# Patient Record
Sex: MALE | Race: WHITE | NOT HISPANIC OR LATINO | Employment: FULL TIME | URBAN - METROPOLITAN AREA
[De-identification: names, ages, dates, MRNs, and addresses within clinical notes are randomized per-mention and may not be internally consistent; named-entity substitution may affect disease eponyms.]

---

## 2017-01-16 ENCOUNTER — COMMUNICATION - HEALTHEAST (OUTPATIENT)
Dept: INTERNAL MEDICINE | Facility: CLINIC | Age: 22
End: 2017-01-16

## 2017-01-16 DIAGNOSIS — F90.0 ATTENTION DEFICIT HYPERACTIVITY DISORDER (ADHD), PREDOMINANTLY INATTENTIVE TYPE: ICD-10-CM

## 2017-01-18 ENCOUNTER — COMMUNICATION - HEALTHEAST (OUTPATIENT)
Dept: INTERNAL MEDICINE | Facility: CLINIC | Age: 22
End: 2017-01-18

## 2017-01-23 ENCOUNTER — AMBULATORY - HEALTHEAST (OUTPATIENT)
Dept: INTERNAL MEDICINE | Facility: CLINIC | Age: 22
End: 2017-01-23

## 2017-04-06 ENCOUNTER — COMMUNICATION - HEALTHEAST (OUTPATIENT)
Dept: INTERNAL MEDICINE | Facility: CLINIC | Age: 22
End: 2017-04-06

## 2017-04-27 ENCOUNTER — COMMUNICATION - HEALTHEAST (OUTPATIENT)
Dept: INTERNAL MEDICINE | Facility: CLINIC | Age: 22
End: 2017-04-27

## 2017-05-24 ENCOUNTER — COMMUNICATION - HEALTHEAST (OUTPATIENT)
Dept: INTERNAL MEDICINE | Facility: CLINIC | Age: 22
End: 2017-05-24

## 2017-07-12 ENCOUNTER — COMMUNICATION - HEALTHEAST (OUTPATIENT)
Dept: INTERNAL MEDICINE | Facility: CLINIC | Age: 22
End: 2017-07-12

## 2017-08-17 ENCOUNTER — OFFICE VISIT - HEALTHEAST (OUTPATIENT)
Dept: INTERNAL MEDICINE | Facility: CLINIC | Age: 22
End: 2017-08-17

## 2017-08-17 ENCOUNTER — COMMUNICATION - HEALTHEAST (OUTPATIENT)
Dept: INTERNAL MEDICINE | Facility: CLINIC | Age: 22
End: 2017-08-17

## 2017-08-17 DIAGNOSIS — R07.9 CHEST PAIN: ICD-10-CM

## 2017-08-17 DIAGNOSIS — R10.32 LEFT GROIN PAIN: ICD-10-CM

## 2017-08-17 DIAGNOSIS — J45.909 ASTHMA: ICD-10-CM

## 2017-08-17 LAB
ATRIAL RATE - MUSE: 57 BPM
DIASTOLIC BLOOD PRESSURE - MUSE: NORMAL MMHG
INTERPRETATION ECG - MUSE: NORMAL
P AXIS - MUSE: 64 DEGREES
PR INTERVAL - MUSE: 174 MS
QRS DURATION - MUSE: 100 MS
QT - MUSE: 398 MS
QTC - MUSE: 387 MS
R AXIS - MUSE: 83 DEGREES
SYSTOLIC BLOOD PRESSURE - MUSE: NORMAL MMHG
T AXIS - MUSE: 46 DEGREES
VENTRICULAR RATE- MUSE: 57 BPM

## 2018-07-23 ENCOUNTER — OFFICE VISIT - HEALTHEAST (OUTPATIENT)
Dept: INTERNAL MEDICINE | Facility: CLINIC | Age: 23
End: 2018-07-23

## 2018-07-23 DIAGNOSIS — G43.909 MIGRAINE: ICD-10-CM

## 2018-07-23 DIAGNOSIS — J02.9 PHARYNGITIS: ICD-10-CM

## 2018-07-23 DIAGNOSIS — L30.9 ECZEMA: ICD-10-CM

## 2018-07-23 DIAGNOSIS — F90.9 ADHD (ATTENTION DEFICIT HYPERACTIVITY DISORDER): ICD-10-CM

## 2018-07-23 DIAGNOSIS — J30.9 ALLERGIC RHINITIS: ICD-10-CM

## 2018-07-23 DIAGNOSIS — Z00.00 ANNUAL PHYSICAL EXAM: ICD-10-CM

## 2018-07-23 DIAGNOSIS — J45.909 ASTHMA: ICD-10-CM

## 2018-07-23 DIAGNOSIS — J45.990 EXERCISE-INDUCED ASTHMA: ICD-10-CM

## 2018-07-23 LAB — DEPRECATED S PYO AG THROAT QL EIA: NORMAL

## 2018-07-23 ASSESSMENT — MIFFLIN-ST. JEOR: SCORE: 1485.21

## 2018-07-24 LAB — GROUP A STREP BY PCR: NORMAL

## 2019-01-28 ENCOUNTER — COMMUNICATION - HEALTHEAST (OUTPATIENT)
Dept: FAMILY MEDICINE | Facility: CLINIC | Age: 24
End: 2019-01-28

## 2019-01-28 DIAGNOSIS — J45.909 ASTHMA: ICD-10-CM

## 2019-03-05 ENCOUNTER — OFFICE VISIT - HEALTHEAST (OUTPATIENT)
Dept: FAMILY MEDICINE | Facility: CLINIC | Age: 24
End: 2019-03-05

## 2019-03-05 DIAGNOSIS — Z00.00 ROUTINE GENERAL MEDICAL EXAMINATION AT A HEALTH CARE FACILITY: ICD-10-CM

## 2019-03-05 DIAGNOSIS — J45.20 MILD INTERMITTENT ASTHMA WITHOUT COMPLICATION: ICD-10-CM

## 2019-03-05 DIAGNOSIS — L72.9 SCALP CYST: ICD-10-CM

## 2019-03-05 LAB
CHOLEST SERPL-MCNC: 204 MG/DL
FASTING STATUS PATIENT QL REPORTED: YES
FASTING STATUS PATIENT QL REPORTED: YES
GLUCOSE BLD-MCNC: 66 MG/DL (ref 70–125)
HDLC SERPL-MCNC: 47 MG/DL
LDLC SERPL CALC-MCNC: 129 MG/DL
TRIGL SERPL-MCNC: 138 MG/DL

## 2019-03-05 ASSESSMENT — MIFFLIN-ST. JEOR: SCORE: 1485.67

## 2020-06-29 ENCOUNTER — OFFICE VISIT - HEALTHEAST (OUTPATIENT)
Dept: FAMILY MEDICINE | Facility: CLINIC | Age: 25
End: 2020-06-29

## 2020-06-29 DIAGNOSIS — J45.20 MILD INTERMITTENT ASTHMA WITHOUT COMPLICATION: ICD-10-CM

## 2020-06-29 DIAGNOSIS — J30.1 SEASONAL ALLERGIC RHINITIS DUE TO POLLEN: ICD-10-CM

## 2020-06-29 RX ORDER — FEXOFENADINE HCL 180 MG/1
180 TABLET ORAL DAILY
Status: SHIPPED | COMMUNITY
Start: 2020-06-29

## 2020-09-25 ENCOUNTER — COMMUNICATION - HEALTHEAST (OUTPATIENT)
Dept: SCHEDULING | Facility: CLINIC | Age: 25
End: 2020-09-25

## 2020-12-02 ENCOUNTER — RECORDS - HEALTHEAST (OUTPATIENT)
Dept: ADMINISTRATIVE | Facility: OTHER | Age: 25
End: 2020-12-02

## 2020-12-09 ENCOUNTER — OFFICE VISIT - HEALTHEAST (OUTPATIENT)
Dept: INTERNAL MEDICINE | Facility: CLINIC | Age: 25
End: 2020-12-09

## 2020-12-09 DIAGNOSIS — J45.990 EXERCISE-INDUCED ASTHMA: ICD-10-CM

## 2020-12-09 DIAGNOSIS — J30.1 NON-SEASONAL ALLERGIC RHINITIS DUE TO POLLEN: ICD-10-CM

## 2020-12-09 DIAGNOSIS — K62.5 RECTAL BLEEDING: ICD-10-CM

## 2021-01-27 ENCOUNTER — RECORDS - HEALTHEAST (OUTPATIENT)
Dept: ADMINISTRATIVE | Facility: OTHER | Age: 26
End: 2021-01-27

## 2021-02-09 ENCOUNTER — RECORDS - HEALTHEAST (OUTPATIENT)
Dept: ADMINISTRATIVE | Facility: OTHER | Age: 26
End: 2021-02-09

## 2021-02-11 ENCOUNTER — OFFICE VISIT - HEALTHEAST (OUTPATIENT)
Dept: INTERNAL MEDICINE | Facility: CLINIC | Age: 26
End: 2021-02-11

## 2021-02-11 ENCOUNTER — AMBULATORY - HEALTHEAST (OUTPATIENT)
Dept: LAB | Facility: HOSPITAL | Age: 26
End: 2021-02-11

## 2021-02-11 DIAGNOSIS — N46.9 INFERTILITY MALE: ICD-10-CM

## 2021-02-11 DIAGNOSIS — F32.1 CURRENT MODERATE EPISODE OF MAJOR DEPRESSIVE DISORDER WITHOUT PRIOR EPISODE (H): ICD-10-CM

## 2021-02-11 DIAGNOSIS — J45.20 MILD INTERMITTENT ASTHMA WITHOUT COMPLICATION: ICD-10-CM

## 2021-02-11 DIAGNOSIS — K62.5 RECTAL BLEEDING: ICD-10-CM

## 2021-02-11 DIAGNOSIS — J30.1 NON-SEASONAL ALLERGIC RHINITIS DUE TO POLLEN: ICD-10-CM

## 2021-02-11 LAB
ALBUMIN SERPL-MCNC: 4.5 G/DL (ref 3.5–5)
ALP SERPL-CCNC: 83 U/L (ref 45–120)
ALT SERPL W P-5'-P-CCNC: 19 U/L (ref 0–45)
ANION GAP SERPL CALCULATED.3IONS-SCNC: 9 MMOL/L (ref 5–18)
AST SERPL W P-5'-P-CCNC: 17 U/L (ref 0–40)
BILIRUB SERPL-MCNC: 1.1 MG/DL (ref 0–1)
BUN SERPL-MCNC: 17 MG/DL (ref 8–22)
CALCIUM SERPL-MCNC: 9.9 MG/DL (ref 8.5–10.5)
CHLORIDE BLD-SCNC: 101 MMOL/L (ref 98–107)
CO2 SERPL-SCNC: 28 MMOL/L (ref 22–31)
CREAT SERPL-MCNC: 1.2 MG/DL (ref 0.7–1.3)
ERYTHROCYTE [DISTWIDTH] IN BLOOD BY AUTOMATED COUNT: 11.7 % (ref 11–14.5)
GFR SERPL CREATININE-BSD FRML MDRD: >60 ML/MIN/1.73M2
GLUCOSE BLD-MCNC: 86 MG/DL (ref 70–125)
HCT VFR BLD AUTO: 49.5 % (ref 40–54)
HGB BLD-MCNC: 17.5 G/DL (ref 14–18)
MCH RBC QN AUTO: 31.1 PG (ref 27–34)
MCHC RBC AUTO-ENTMCNC: 35.4 G/DL (ref 32–36)
MCV RBC AUTO: 88 FL (ref 80–100)
PLATELET # BLD AUTO: 275 THOU/UL (ref 140–440)
PMV BLD AUTO: 9.6 FL (ref 7–10)
POTASSIUM BLD-SCNC: 5 MMOL/L (ref 3.5–5)
PROT SERPL-MCNC: 7.9 G/DL (ref 6–8)
RBC # BLD AUTO: 5.63 MILL/UL (ref 4.4–6.2)
SODIUM SERPL-SCNC: 138 MMOL/L (ref 136–145)
TSH SERPL DL<=0.005 MIU/L-ACNC: 0.97 UIU/ML (ref 0.3–5)
WBC: 6.2 THOU/UL (ref 4–11)

## 2021-02-11 RX ORDER — MONTELUKAST SODIUM 10 MG/1
10 TABLET ORAL DAILY
Qty: 90 TABLET | Refills: 2 | Status: SHIPPED | OUTPATIENT
Start: 2021-02-11 | End: 2022-06-30

## 2021-02-11 RX ORDER — ALBUTEROL SULFATE 90 UG/1
2 AEROSOL, METERED RESPIRATORY (INHALATION) EVERY 6 HOURS PRN
Qty: 1 EACH | Refills: 5 | Status: SHIPPED | OUTPATIENT
Start: 2021-02-11 | End: 2022-02-22

## 2021-02-11 ASSESSMENT — PATIENT HEALTH QUESTIONNAIRE - PHQ9: SUM OF ALL RESPONSES TO PHQ QUESTIONS 1-9: 15

## 2021-02-12 LAB
ANNOTATION COMMENT IMP: NORMAL
CHARACTER SMN: NORMAL
IMM GERM CELLS # SMN: 0 X10(6)
PATIENT LOCATION: NORMAL
PH SMN: 8.5 [PH]
SEX ABSTIN DURATION TIME PATIENT: 2 D
SMN ANALYSIS METHOD: NORMAL
SPEC CONTAINER SPEC: NORMAL
SPECIMEN VOL SMN: 2.5 ML
SPERM # SMN: 79.9 X10(6)
SPERM ABNORM HEAD SHAPE NFR SMN: 14.5 %
SPERM ABNORM HEAD SIZE NFR SMN: 0 %
SPERM ABNORM MIDPIECE NFR SMN: 23.5 %
SPERM ABNORM TAIL NFR SMN: 25.5 %
SPERM ACROSOME DEFECTS NFR SMN: 21.5 %
SPERM AGGLUTINATED SMN QL: 4
SPERM DOUBLE FORMS NFR SMN: 0 %
SPERM MOTILE # SMN: 52.7 X10(6)
SPERM MOTILE NFR SMN: 131.8 X10(6)
SPERM MOTILE NFR SMN: 66 %
SPERM MOTILE SMN QL MICRO: 3
SPERM MULTIPLE DEFECTS NFR SMN: 0 %
SPERM NORM NFR SMN: 15 %
VISC SMN QL: 3
WBC # SMN: 0 X10(6)

## 2021-04-22 ENCOUNTER — OFFICE VISIT - HEALTHEAST (OUTPATIENT)
Dept: BEHAVIORAL HEALTH | Facility: CLINIC | Age: 26
End: 2021-04-22

## 2021-04-22 DIAGNOSIS — F90.0 ATTENTION DEFICIT HYPERACTIVITY DISORDER (ADHD), PREDOMINANTLY INATTENTIVE TYPE: ICD-10-CM

## 2021-04-22 DIAGNOSIS — F32.1 CURRENT MODERATE EPISODE OF MAJOR DEPRESSIVE DISORDER WITHOUT PRIOR EPISODE (H): ICD-10-CM

## 2021-04-22 ASSESSMENT — ANXIETY QUESTIONNAIRES
GAD7 TOTAL SCORE: 8
4. TROUBLE RELAXING: SEVERAL DAYS
2. NOT BEING ABLE TO STOP OR CONTROL WORRYING: SEVERAL DAYS
6. BECOMING EASILY ANNOYED OR IRRITABLE: SEVERAL DAYS
IF YOU CHECKED OFF ANY PROBLEMS ON THIS QUESTIONNAIRE, HOW DIFFICULT HAVE THESE PROBLEMS MADE IT FOR YOU TO DO YOUR WORK, TAKE CARE OF THINGS AT HOME, OR GET ALONG WITH OTHER PEOPLE: SOMEWHAT DIFFICULT
1. FEELING NERVOUS, ANXIOUS, OR ON EDGE: SEVERAL DAYS
5. BEING SO RESTLESS THAT IT IS HARD TO SIT STILL: SEVERAL DAYS
7. FEELING AFRAID AS IF SOMETHING AWFUL MIGHT HAPPEN: MORE THAN HALF THE DAYS
3. WORRYING TOO MUCH ABOUT DIFFERENT THINGS: SEVERAL DAYS

## 2021-04-22 ASSESSMENT — PATIENT HEALTH QUESTIONNAIRE - PHQ9: SUM OF ALL RESPONSES TO PHQ QUESTIONS 1-9: 14

## 2021-05-04 ENCOUNTER — OFFICE VISIT - HEALTHEAST (OUTPATIENT)
Dept: BEHAVIORAL HEALTH | Facility: CLINIC | Age: 26
End: 2021-05-04

## 2021-05-04 DIAGNOSIS — F90.0 ATTENTION DEFICIT HYPERACTIVITY DISORDER (ADHD), PREDOMINANTLY INATTENTIVE TYPE: ICD-10-CM

## 2021-05-06 ENCOUNTER — AMBULATORY - HEALTHEAST (OUTPATIENT)
Dept: NURSING | Facility: CLINIC | Age: 26
End: 2021-05-06

## 2021-05-18 ENCOUNTER — OFFICE VISIT - HEALTHEAST (OUTPATIENT)
Dept: BEHAVIORAL HEALTH | Facility: CLINIC | Age: 26
End: 2021-05-18

## 2021-05-18 DIAGNOSIS — F90.0 ATTENTION DEFICIT HYPERACTIVITY DISORDER (ADHD), PREDOMINANTLY INATTENTIVE TYPE: ICD-10-CM

## 2021-05-27 ENCOUNTER — AMBULATORY - HEALTHEAST (OUTPATIENT)
Dept: NURSING | Facility: CLINIC | Age: 26
End: 2021-05-27

## 2021-05-27 ASSESSMENT — PATIENT HEALTH QUESTIONNAIRE - PHQ9
SUM OF ALL RESPONSES TO PHQ QUESTIONS 1-9: 14
SUM OF ALL RESPONSES TO PHQ QUESTIONS 1-9: 15

## 2021-05-28 ASSESSMENT — ASTHMA QUESTIONNAIRES
ACT_TOTALSCORE: 22
ACT_TOTALSCORE: 20

## 2021-05-28 ASSESSMENT — ANXIETY QUESTIONNAIRES: GAD7 TOTAL SCORE: 8

## 2021-05-31 VITALS — HEIGHT: 64 IN | BODY MASS INDEX: 23.8 KG/M2 | WEIGHT: 139.4 LBS

## 2021-06-01 VITALS — WEIGHT: 131 LBS | BODY MASS INDEX: 22.36 KG/M2 | HEIGHT: 64 IN

## 2021-06-02 VITALS — WEIGHT: 131.1 LBS | HEIGHT: 64 IN | BODY MASS INDEX: 22.38 KG/M2

## 2021-06-05 VITALS
BODY MASS INDEX: 25.39 KG/M2 | SYSTOLIC BLOOD PRESSURE: 104 MMHG | DIASTOLIC BLOOD PRESSURE: 70 MMHG | TEMPERATURE: 97.8 F | HEART RATE: 64 BPM | WEIGHT: 147.9 LBS

## 2021-06-05 VITALS
TEMPERATURE: 97.8 F | BODY MASS INDEX: 26.61 KG/M2 | DIASTOLIC BLOOD PRESSURE: 74 MMHG | SYSTOLIC BLOOD PRESSURE: 122 MMHG | WEIGHT: 155 LBS

## 2021-06-09 NOTE — PROGRESS NOTES
"William Bansal is a 24 y.o. male who is being evaluated via a billable video visit.      The patient has been notified of following:     \"This video visit will be conducted via a call between you and your physician/provider. We have found that certain health care needs can be provided without the need for an in-person physical exam.  This service lets us provide the care you need with a video conversation.  If a prescription is necessary we can send it directly to your pharmacy.  If lab work is needed we can place an order for that and you can then stop by our lab to have the test done at a later time.    Video visits are billed at different rates depending on your insurance coverage. Please reach out to your insurance provider with any questions.    If during the course of the call the physician/provider feels a video visit is not appropriate, you will not be charged for this service.\"    Patient has given verbal consent to a Video visit? Yes  How would you like to obtain your AVS? AVS Preference: MyChart.  Patient would like the video invitation sent by: Text to cell phone: [phone]  Will anyone else be joining your video visit? No        Video Start Time: 2:42 PM    The purpose of this video visit is to follow-up on asthma.  Patient has asthma that is most consistent with mild intermittent asthma.  Typical triggers have been exercise and occasionally allergies.  Over the last several months patient is reporting more frequent symptoms requiring use of inhaler almost daily.  Patient denies any illness.  He denies any shortness of breath with activity.  Denies chest pain.  Otherwise feels well.  Patient does note seasonal allergies.  Has been using Allegra and feels he is needed to use Benadryl on occasion for more symptoms.  Discussed benefits of adding steroid nasal spray to treat allergy symptoms.  Discussed considerations related to consideration of controller inhaler.  Patient otherwise feels well has no " other complaints or concerns.    Additional provider notes: GENERAL: Healthy, alert and no distress  EYES: Eyes grossly normal to inspection. No discharge or erythema, or obvious scleral/conjunctival abnormalities.  RESP: No audible wheeze, cough, or visible cyanosis.  No visible retractions or increased work of breathing.    NEURO: Cranial nerves grossly intact. Mentation and speech appropriate for age.  PSYCH: Mentation appears normal, affect normal/bright, judgement and insight intact, normal speech and appearance well-groomed        Diagnoses and all orders for this visit:    Mild intermittent asthma without complication  -     albuterol (PROAIR HFA;PROVENTIL HFA;VENTOLIN HFA) 90 mcg/actuation inhaler; Inhale 2 puffs every 6 (six) hours as needed for wheezing.  Dispense: 1 each; Refill: 5    Seasonal allergic rhinitis due to pollen          Obtain steroid nasal spray.  If this works to reduce frequency of inhaler use no further action will be necessary.  If in the next several weeks he needs to persistently use inhaler once daily we should consider addition of controller medication.    Video-Visit Details    Type of service:  Video Visit    Video End Time (time video stopped): 2:49 PM  Originating Location (pt. Location): Home    Distant Location (provider location):  [facility]/OB     Platform used for Video Visit: Britt Marina MD

## 2021-06-11 NOTE — TELEPHONE ENCOUNTER
Pt called requesting albuterol Proventil refill and sent to [facility] pharmacy in [facility], writer called pharmacy and gave them verbal order with 4 refills remaining. Writer also called [facility] pharmacy to ask if pt picked up any refills, and was told no, pt still have 5 refills left. Writer called back pt and left him a voicemail that medication was called to his requested pharmacy.    Harrison VarnerRN    Additional Information    Caller has medication question only, adult not sick, and triager answers question    Protocols used: MEDICATION QUESTION CALL-A-

## 2021-06-13 NOTE — PROGRESS NOTES
Internal Medicine Office Visit  [facility]   Patient Name: William Bansal  Patient Age: 24 y.o.  YOB: 1995  MRN: [id number]    Date of Visit: 12/9/2020  Reason for Office Visit:   Chief Complaint   Patient presents with     Rectal Bleeding     pt notes sinceHS. pt notes in the past month he has had 5-6 episodes            Assessment / Plan / Medical Decision Making:      Rectal bleeding  Patient lives in [place] at the current time and returns home tomorrow. He is advised to set up an appointment with primary care to review a referral for a colonoscopy and/or stool tests for further evaluation of rectal bleeding. Vital signs today are stable, blood loss described is minimal and symptoms have been going on for 10+ years so I do not see a reason that he cannot return home tomorrow and pursue further evaluation locally.     Conservative measures for treatment of hemorrhoids reviewed- wet wipes, sitz baths, soft stools, avoidance of prolonged sitting and straining     Non-seasonal allergic rhinitis due to pollen  Exercise-induced asthma  - not well controlled at this time  - START: montelukast (SINGULAIR) 10 mg tablet  Dispense: 30 tablet; Refill: 2  - follow up with PCP in [place]         Health Maintenance Review  Health Maintenance   Topic Date Due     ASTHMA ACTION PLAN  1995     HEPATITIS C SCREENING  1995     Pneumococcal Vaccine: Pediatrics (0 to 5 Years) and At-Risk Patients (6 to 64 Years) (1 of 1 - PPSV23) 12/18/2000     HIV SCREENING  12/18/2009     ADVANCE CARE PLANNING  12/18/2012     PREVENTIVE CARE VISIT  07/23/2019     Asthma Control Test  12/11/2021     TD 18+ HE  07/23/2028     HEPATITIS B VACCINES  Completed     HPV VACCINES  Completed     INFLUENZA VACCINE RULE BASED  Completed     TDAP ADULT ONE TIME DOSE  Completed         I am having Isac Bansal start on montelukast. I am also having him maintain his albuterol, fexofenadine, albuterol, and  Afluria Qd 2020-21(3yr up)(PF).        No orders of the defined types were placed in this encounter.  Followup: Return in about 2 weeks (around 12/23/2020) for Recheck with local PCP . earlier if needed.        Brielle Rodriguez CNP        HPI:  William Bansal is a 24 y.o. year old who presents to the office today for a c/o rectal bleeding.     For the past 10 years he has noted occasional blood in the stool. In the past 1 month it has gotten worse and occurs 1-2 times per week. The blood tends to occur when he has softer stools. The stools look like there is blood mixed in with the stool. About every 3-4 months he has darker appearing stools. No night sweats, unintentional weight loss, abdominal pain. No rectal pain but he does notice itching. He has a history of hemorrhoids.     Family history:  Paternal grandfather- colon cancer  Mother- crohns     Asthma was reviewed. His symptoms have been worse since living there. He has to use the rescue inhaler 4 times per week on average.     Review of Systems:  As in HPI       Current Scheduled Meds:  Outpatient Encounter Medications as of 12/9/2020   Medication Sig Dispense Refill     albuterol (PROAIR HFA;PROVENTIL HFA;VENTOLIN HFA) 90 mcg/actuation inhaler Inhale 2 puffs every 6 (six) hours as needed for wheezing. 1 Inhaler 3     albuterol (PROAIR HFA;PROVENTIL HFA;VENTOLIN HFA) 90 mcg/actuation inhaler Inhale 2 puffs every 6 (six) hours as needed for wheezing. 1 each 5     fexofenadine (ALLEGRA) 180 MG tablet Take 180 mg by mouth daily.       AFLURIA QD 2020-21,3YR UP,,PF, 60 mcg (15 mcg x 4)/0.5 mL Syrg        montelukast (SINGULAIR) 10 mg tablet Take 1 tablet (10 mg total) by mouth daily. 30 tablet 2     No facility-administered encounter medications on file as of 12/9/2020.          History reviewed. No pertinent past medical history.  Past Surgical History:   Procedure Laterality Date     INGUINAL HERNIA REPAIR Left     pt states he had this at age 1-2 yrs old      Social History     Tobacco Use     Smoking status: Never Smoker     Smokeless tobacco: Never Used   Substance Use Topics     Alcohol use: Yes     Alcohol/week: 2.0 standard drinks     Types: 1 Cans of beer, 1 Shots of liquor per week     Drug use: Yes     Types: Marijuana     Comment: Used legally in [place] but not in [place]       Objective / Physical Examination:  Vitals:    12/09/20 1710   BP: 122/74   Patient Position: Sitting   Temp: 97.8  F (36.6  C)   TempSrc: Oral   Weight: 155 lb (70.3 kg)     Wt Readings from Last 3 Encounters:   12/09/20 155 lb (70.3 kg)   03/05/19 131 lb 1.6 oz (59.5 kg)   07/23/18 131 lb (59.4 kg)     Body mass index is 26.61 kg/m .     Constitutional: In no apparent distress  Eyes: PERRL, no conjunctival pallor. Non-icteric.   Respiratory: Clear to auscultation bilaterally. Normal inspiratory and expiratory effort  Cardiovascular: Regular rate and rhythm

## 2021-06-15 NOTE — PROGRESS NOTES
Internal Medicine Office Visit  [facility]   Patient Name: William Bansal  Patient Age: 25 y.o.  YOB: 1995  MRN: [id number]    Date of Visit: 2/11/2021  Reason for Office Visit:   Chief Complaint   Patient presents with     Depression           Assessment / Plan / Medical Decision Making:      Mild intermittent asthma without complication  Stable with current as needed albuterol  - albuterol (PROAIR HFA;PROVENTIL HFA;VENTOLIN HFA) 90 mcg/actuation inhaler  Dispense: 1 each; Refill: 5    Current moderate episode of major depressive disorder without prior episode (H)  - AMB REFERRAL TO MENTAL HEALTH AND ADDICTION  - Adult (18+); Outpatient Treatment; Individual/Couples/Family/Group Therapy/Health Psychology; [facility]; Any Clinic Location; We will contact you to schedule the appointment or plea...  - medication was briefly discussed, he would like to defer this option for now but will follow up if he would like to review this in more detail in the future    Non-seasonal allergic rhinitis due to pollen  - montelukast (SINGULAIR) 10 mg tablet  Dispense: 90 tablet; Refill: 2    Infertility male  - Thyroid Stimulating Hormone (TSH)  - HM2(CBC w/o Differential)  - Comprehensive Metabolic Panel    Rectal bleeding  - Ambulatory referral for Colonoscopy       I am having Isac Bansal maintain his fexofenadine, Afluria Qd 2020-21(3yr up)(PF), albuterol, and montelukast.        Orders Placed This Encounter   Procedures     Thyroid Stimulating Hormone (TSH)     HM2(CBC w/o Differential)     Comprehensive Metabolic Panel     Semen Analysis     AMB REFERRAL TO MENTAL HEALTH AND ADDICTION  - Adult (18+); Outpatient Treatment; Individual/Couples/Family/Group Therapy/Health Psychology; [facility]; Any Clinic Location; We will contact you to schedule the appointment or plea...     Ambulatory referral for Colonoscopy   Followup: Return in about 3 months  (around 5/11/2021) for Next scheduled follow up. earlier if needed.        Brielle Rodriguez CNP        HPI:  William Bansal is a 25 y.o. year old who presents to the office today for concerns regarding his mood. For the past 6 years he has felt down and depressed. About 5 years ago he had a therapist that the saw for a few sessions and found this helpful.  He has never been on any medication for anxiety or depression. Recently he has gone through a bad break up and moved from [place] to [place]. He denies thoughts of suicide. He cut himself recently on his forearm which he states was to get his ex's attention. He denies that he has thoughts of further harming himself.     He was last seen with a complaint of rectal bleeding.  This has ceased entirely.  He has not followed up with anyone for a colonoscopy.  The bleeding is ongoing for about 10 years or so.  He does have a history of hemorrhoids but has never had the bleeding evaluated.  His mother has a history of Crohn's disease.  No weight loss or abdominal pain.    His asthma was reviewed.  His symptoms were better recently.  He was living with dogs when he lived in [place] which he is allergic to and symptoms have now improved seen that he is out of that environment.    He has a question today regarding infertility.  He had unprotected sex and his most recent relationship.  She has had 3 prior children but never became pregnant while with him.  He has had other relationships as well that have not resulted in a pregnancy.          Health Maintenance Review  Health Maintenance   Topic Date Due     ASTHMA ACTION PLAN  1995     DEPRESSION ACTION PLAN  1994     HEPATITIS C SCREENING  1994     Pneumococcal Vaccine: Pediatrics (0 to 5 Years) and At-Risk Patients (6 to 64 Years) (1 of 2 - PPSV23) 12/18/2000     HIV SCREENING  12/18/2009     ADVANCE CARE PLANNING  12/18/2012     PREVENTIVE CARE VISIT  07/23/2019     Asthma Control Test  02/11/2022      TD 18+ HE  07/23/2028     HEPATITIS B VACCINES  Completed     HPV VACCINES  Completed     INFLUENZA VACCINE RULE BASED  Completed     TDAP ADULT ONE TIME DOSE  Completed       Current Scheduled Meds:  Outpatient Encounter Medications as of 2/11/2021   Medication Sig Dispense Refill     AFLURIA QD 2020-21,3YR UP,,PF, 60 mcg (15 mcg x 4)/0.5 mL Syrg        albuterol (PROAIR HFA;PROVENTIL HFA;VENTOLIN HFA) 90 mcg/actuation inhaler Inhale 2 puffs every 6 (six) hours as needed for wheezing. 1 each 5     fexofenadine (ALLEGRA) 180 MG tablet Take 180 mg by mouth daily.       montelukast (SINGULAIR) 10 mg tablet Take 1 tablet (10 mg total) by mouth daily. 90 tablet 2     [DISCONTINUED] albuterol (PROAIR HFA;PROVENTIL HFA;VENTOLIN HFA) 90 mcg/actuation inhaler Inhale 2 puffs every 6 (six) hours as needed for wheezing. 1 Inhaler 3     [DISCONTINUED] albuterol (PROAIR HFA;PROVENTIL HFA;VENTOLIN HFA) 90 mcg/actuation inhaler Inhale 2 puffs every 6 (six) hours as needed for wheezing. 1 each 5     [DISCONTINUED] montelukast (SINGULAIR) 10 mg tablet Take 1 tablet (10 mg total) by mouth daily. 30 tablet 2     No facility-administered encounter medications on file as of 2/11/2021.          Objective / Physical Examination:  Vitals:    02/11/21 0848   BP: 104/70   Pulse: 64   Temp: 97.8  F (36.6  C)   TempSrc: Oral   Weight: 147 lb 14.4 oz (67.1 kg)     Wt Readings from Last 3 Encounters:   02/11/21 147 lb 14.4 oz (67.1 kg)   12/09/20 155 lb (70.3 kg)   03/05/19 131 lb 1.6 oz (59.5 kg)     Body mass index is 25.39 kg/m .     Skin: left forearm healing superficial laceration. No other scars on the forearm or wrist.   Psych: Alert and oriented x3. Mood is depression and anxious. Normal thought process. Dressed appropriately for weather/situation

## 2021-06-16 NOTE — PROGRESS NOTES
Mental Health Visit Note    Patient: William Bansal    : 1995 MRN: [id number]    2021    Start time: 0800    Stop Time: 08   Session # 1    Session Type: Patient is presenting for an Individual session.    William Bansal is a 25 y.o. male is being seen today for per referral from TOMAS Carter for initial intake.  Patient stated that he moved back from [place] 4 months ago.  Was attending college there although, did not complete schooling.  Prior to that attended [employer] in [place].  Had changed his major several times. 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  Indicates that his family is involved and supportive.  Patient stated, that he had originally been diagnosed with ADHD in his early teens and was then on Concerta for a brief period of time. Currently, is not on any stimulant medication, antidepressants or anti-anxiety medications. Chart indicates that he occasionally uses alcohol and marijuana. Patient reports that 5 years ago he saw a Therapist and felt it was helpful and would like to be able to gain skills and help manage symptoms.  Patient acknowledges that he has difficulty with follow-through and procrastination which is beginning to negatively impact his employment.  He plans on returning back to college to finish his degree although is not certain where that will be at this time.    Telemedicine Visit: The patient's condition can be safely assessed and treated via synchronous audio and visual telemedicine encounter.      Reason for Telemedicine Visit: Patient has requested telehealth visit    Originating Site (Patient Location): Patient's home    Distant Site (Provider Location): Provider Remote Setting- Home Office    Consent:  The patient/guardian has verbally consented to: the potential risks and benefits of telemedicine (video visit) versus in person care; bill my insurance or make self-payment for services provided;  and responsibility for payment of non-covered services.     Mode of Communication:  Video Conference via [employer]    As the provider I attest to compliance with applicable laws and regulations related to telemedicine.    Those present for this visit (patient and therapist)    New symptoms or complaints: Decreased interest and pleasure in doing things, feeling down, trouble sleeping and staying asleep, feeling tired having little energy, overeating, feeling bad about self, trouble concentrating on things, procrastination, difficulty following through, feeling nervous and anxious, not being able to control worry, worrying too much, trouble relaxing, being restless hard to sit still, feeling afraid something awful might happen.    Functional Impairment:   Personal: 3  Family: 2  Work: 2  Social:3    PHQ-9 scoring 14 indicating moderate depression  UMA-7    scoring 8 indicating mild anxiety  C-SSRS no indication of past or current suicidal ideations or intentions    ASSESSMENT: Current Emotional / Mental Status (status of significant symptoms):              Risk status (Self / Other harm or suicidal ideation)              Patient denies any safety issues              Patient denies current or recent suicidal ideation or behaviors.              Patient denies current or recent homicidal ideation or behaviors.              Patient denies current or recent self injurious behavior or ideation.              Patient denies other safety concerns.              Patient denies any risk factors              Patient indicates family are supportive and involved              Recommended that patient call 911 or go to the local ED should there be a change in any of these risk factors.                Attitude:                                   Cooperative               Orientation:                             Oriented x3              Speech                                    Clear                          Rate / Production:        Normal/ Responsive Normal                           Volume:                       Normal               Mood:                                      Normal              Thought Content:                    Clear               Thought Form:                        Coherent  Logical               Insight:                                     Good       Patient's impression of their current status: Patient indicated that he has sought therapy in the past and it was helpful and felt he was at a point he needed to see a therapist again.      Therapist impression of patients current state: The patient presented for an initial session with provider.  Patient is a 25 yr. who was referred by Brielle Rodriguez FMP to engage in individual psychotherapy to address symptoms of depression, anxiety and psychosocial stressors.  Patient appeared cooperative and open-minded throughout the intake and easily engaged in dialogue.  Therapist and patient verbally reviewed consent to privacy policy.  Patient reported understanding and provided verbal consent.  The patient has not engaged in outpatient psychotherapy services in the community so there is no diagnostic assessment on file available.  The patient completed the following questionnaires today PHQ-9, UMA-7 and C-SSRS.  No concerns about patient safety or the safety of others per assessment today.  Therapist will review patient's further history, mail out intake form and follow-up in order to complete a standard diagnostic assessment.  Goals for treatment will be established after the second or third follow-up session with this provider.    Type of psychotherapeutic technique provided: Insight oriented and Solution-focused techniques and strategies to manage ADHD symptoms.    Progress toward short term goals: Not yet established    Review of long term goals: Not yet established     Diagnosis:  1. ADHD (inattentive)  2.  Current episode of major depressive disorder (without prior  episode)    Plan and Follow up:   1.  Patient to complete intake form  2.  Begin developing therapeutic relationship  3.  Patient to begin monitoring symptoms that are problematic  4.  ADHD education  5.  Next appointment in 2 weeks    Discharge Criteria/Planning: Patient will continue with follow-up until therapy can be discontinued without return of signs and symptoms.    I have reviewed the note as documented above.  This accurately captures the substance of my conversation with the patient.  As the provider I attest to compliance with applicable laws and regulations related to telemedicine.  Performed and documented by (provider name)     Mel Adair [facility]  4/22/21

## 2021-06-17 NOTE — PROGRESS NOTES
Mental Health Visit Note    Patient: William Bansal    : 1995 MRN: [id number]    2021    Start time: 0900    Stop Time: 952   Session # 3    Session Type: Patient is presenting for an Individual session.      Chief complaint  William Bansal is a 25 y.o. male is being seen today for per referral from TOMAS Carter for initial intake.  Patient stated that he moved back from [place] 4 months ago.  Was attending college there although, did not complete schooling.  Prior to that attended [employer] in [place].  Had changed his major several times. 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 Indicates that his family is involved and supportive.  Patient stated, that he had originally been diagnosed with ADHD in his early teens and was then on Concerta for a brief period of time. Currently, is not on any stimulant medication, antidepressants or anti-anxiety medications. Chart indicates that he occasionally uses alcohol and marijuana. Patient reports that 5 years ago he saw a Therapist and felt it was helpful and would like to be able to gain skills and help manage symptoms.  Patient acknowledges that he has difficulty with follow-through and procrastination which is beginning to negatively impact his employment.  He plans on returning back to college to finish his degree although is not certain where that will be      Telemedicine Visit: The patient's condition can be safely assessed and treated via synchronous audio and visual telemedicine encounter.       Reason for Telemedicine Visit: Patient has requested telehealth visit     Originating Site (Patient Location): Patient's home     Distant Site (Provider Location): Provider Remote Setting- Home Office     Consent:  The patient/guardian has verbally consented to: the potential risks and benefits of telemedicine (video visit) versus in person care; bill my insurance or make self-payment for services  "provided; and responsibility for payment of non-covered services.      Mode of Communication:  Video Conference via Doxy     As the provider I attest to compliance with applicable laws and regulations related to telemedicine.     Those present for this visit (Patient and Therapist)     Follow up  Patient has not yet completed the intake information.  Work is going \"okay\" is looking at going back to college the summer.  Uncertain regarding his long-term plans and goals.     New symptoms or complaints: Uncertainty regarding plans, organization and follow-through     Functional Impairment:   Personal: 3  Family: 2  Work: 2  Social:3     ASSESSMENT: Current Emotional / Mental Status (status of significant symptoms):        Risk status (Self / Other harm or suicidal ideation)              Patient denies any safety issues              Patient denies current or recent suicidal ideation or behaviors.              Patient denies current or recent homicidal ideation or behaviors.              Patient denies current or recent self injurious behavior or ideation.              Patient denies other safety concerns.              Patient denies any risk factors              Patient indicates family are supportive and involved              Recommended that patient call 911 or go to the local ED should there be a change in any of these risk factors.                 Attitude:                                   Cooperative               Orientation:                             Oriented x3              Speech                                    Clear                          Rate / Production:       Normal/ Responsive Normal                           Volume:                       Normal               Mood:                                      Normal              Thought Content:                    Clear               Thought Form:                        Coherent  Logical " "              Insight:                                     Good              Patient's impression of their current status:  Patient stated, \"I guess I am doing okay right now.\"    Therapist impression of patients current state:  During the session we discussed techniques and strategies for organization and follow-through.  As well as, long-term goals and aspirations.  Patient indicated that he is hesitant to pursue the feels he is interested in because of it requiring going on for a masters and/or a PhD.  Patient described the various types of jobs he has had in the past and those fears that are keeping him from pursuing those goals.    Type of psychotherapeutic technique provided: Insight oriented and Solution-focused ADHD education strategies and approaches     Progress toward short term goals Not yet established     Review of long term goals: Not yet established     Diagnosis:  1. ADHD (inattentive)  2.  Current episode of major depressive disorder (without prior episode)     Plan and Follow up:   1.  Patient to complete intake form  2.  Begin developing therapeutic relationship  3.  Patient to begin monitoring symptoms that are problematic  4.  ADHD education  5.  Next appointment in 2 weeks  6.  Patient will explore the possibility of the field of sustainability  7. Establish short and long-term goals in regard to his college education.    Discharge Criteria/Planning: Patient will continue with follow-up until therapy can be discontinued without return of signs and symptoms.     I have reviewed the note as documented above.  This accurately captures the substance of my conversation with the patient.  As the provider I attest to compliance with applicable laws and regulations related to telemedicine    Performed and documented by  Mel Adair [facility]  5/18/21        "

## 2021-06-17 NOTE — PROGRESS NOTES
Mental Health Visit Note    Patient: William Bansal    : 1995 MRN: [id number]    2021    Start time: 0900   Stop Time: 952   Session # 2    Session Type: Patient is presenting for an Individual session.     Chief complaint  William Bansal is a 25 y.o. male is being seen today for per referral from TOMAS Carter for initial intake.  Patient stated that he moved back from [place] 4 months ago.  Was attending college there although, did not complete schooling.  Prior to that attended [employer] in [place].  Had changed his major several times. 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 Indicates that his family is involved and supportive.  Patient stated, that he had originally been diagnosed with ADHD in his early teens and was then on Concerta for a brief period of time. Currently, is not on any stimulant medication, antidepressants or anti-anxiety medications. Chart indicates that he occasionally uses alcohol and marijuana. Patient reports that 5 years ago he saw a Therapist and felt it was helpful and would like to be able to gain skills and help manage symptoms.  Patient acknowledges that he has difficulty with follow-through and procrastination which is beginning to negatively impact his employment.  He plans on returning back to college to finish his degree although is not certain where that will be     Telemedicine Visit: The patient's condition can be safely assessed and treated via synchronous audio and visual telemedicine encounter.      Reason for Telemedicine Visit: Patient has requested telehealth visit    Originating Site (Patient Location): Patient's home    Distant Site (Provider Location): Provider Remote Setting- Home Office    Consent:  The patient/guardian has verbally consented to: the potential risks and benefits of telemedicine (video visit) versus in person care; bill my insurance or make self-payment for services  "provided; and responsibility for payment of non-covered services.     Mode of Communication:  Video Conference via Doxy    As the provider I attest to compliance with applicable laws and regulations related to telemedicine.    Those present for this visit (Patient and Therapist)    Follow up Patient indicated that he built a computer which he plans on using for school.    New symptoms or complaints: None    Functional Impairment:   Personal: 3  Family: 2  Work: 2  Social:3    ASSESSMENT: Current Emotional / Mental Status (status of significant symptoms):        Risk status (Self / Other harm or suicidal ideation)              Patient denies any safety issues              Patient denies current or recent suicidal ideation or behaviors.              Patient denies current or recent homicidal ideation or behaviors.              Patient denies current or recent self injurious behavior or ideation.              Patient denies other safety concerns.              Patient denies any risk factors              Patient indicates family are supportive and involved              Recommended that patient call 911 or go to the local ED should there be a change in any of these risk factors.                 Attitude:                                   Cooperative               Orientation:                             Oriented x3              Speech                                    Clear                          Rate / Production:       Normal/ Responsive Normal                           Volume:                       Normal               Mood:                                      Normal              Thought Content:                    Clear               Thought Form:                        Coherent  Logical               Insight:                                     Good            Patient's impression of their current status: Patient stated, \"I think I am doing okay I am sleeping good and eating well.    Therapist impression of " patients current state: During today's session we discussed awareness of his tendency to be impulsive and the effects that can have in both a negative and a positive way in his life.  He appears to be more optimistic and understands the test necessary before he returns back to college.    Type of psychotherapeutic technique provided: Insight oriented and Solution-focused ADHD education strategies and approaches    Progress toward short term goals Not yet established    Review of long term goals: Not yet established    Diagnosis:  1. ADHD (inattentive)  2.  Current episode of major depressive disorder (without prior episode)     Plan and Follow up:   1.  Patient to complete intake form  2.  Begin developing therapeutic relationship  3.  Patient to begin monitoring symptoms that are problematic  4.  ADHD education  5.  Next appointment in 2 weeks    Discharge Criteria/Planning: Patient will continue with follow-up until therapy can be discontinued without return of signs and symptoms.    I have reviewed the note as documented above.  This accurately captures the substance of my conversation with the patient.  As the provider I attest to compliance with applicable laws and regulations related to telemedicine.  Performed and documented by  Mel Smith [facility]  5/4/21

## 2021-06-19 NOTE — PROGRESS NOTES
"Assessment/Plan:     1. Annual physical exam  - testicular self exam was reviewed and encouraged  - I recommended he eat a healthy diet and exercise on a regular basis.    2. Pharyngitis  - advised supportive cares  - Rapid Strep A Screen-Throat  - Group A Strep, RNA Direct Detection, Throat    3. Asthma  - Stable  - We discussed seeing a provider in [place] if he is using his albuterol more than once per week if he needs to intensify asthma management  - Return in 6 months for asthma recheck   - albuterol (PROAIR HFA;PROVENTIL HFA;VENTOLIN HFA) 90 mcg/actuation inhaler; Inhale 2 puffs every 6 (six) hours as needed for wheezing.  Dispense: 2 Inhaler; Refill: 0    4. ADHD (attention deficit hyperactivity disorder)  - Infrequent use of methylphenidate as he does not like how this medication makes him feel  - Declines alternative treatment at this time     5. Allergic rhinitis  - stable with OTC antihistamine PRN     6. Eczema  - Infrequent use of topical steroid  - Regular emollient encouraged     8. Migraine  - Stable, treats with PRN OTC acetaminophen or NSAID        I recommended he eat a healthy diet and exercise on a regular basis.      Subjective:     Willima Bansal is a 22 y.o. male who presents for an annual exam.     He has a new complaint of a pain he classifies as a \"ringing sensation\" in the left occipital area of the scalp. He has had an injury required sutures in this area in the past. Would like it checked. No changes in hearing, vision.    Has a sore throat. Symptoms started about 2 days after, he is worried he caught something on the plane. He has not been feverish but has been more sweaty at night.     He has been more concerned about his heart. His friend was recently told that he has to have heart surgery so he has been more aware of his heart now. When he is talking he has a sense that there is a rubber band around his heart. He does not get short of breath.     Asthma reviewed. He typically has " sports induced asthma. He is living in [place], notices a difference when the air quality is bad. He uses his rescue inhaler about once per week on average. He used to use a daily inhaler but has not needed this recently.     He has recurrent eczema on the feet, he has not had a rash in over 1 year at this time.     We reviewed his history of ADHD. He has a prescription for Concerta but only takes this for tests because he does not like how he feels when he takes this medication.     Migraine headaches reviewed. Per month he may have 1-2 migraines. He typically sleeps for treatment, takes an OTC pain reliever if needed.     He has a new complaint of a sore throat and some nasal congestion/coughing. No fevers or chills.     The patient reports that there is not domestic violence in his life.     Healthy Habits:   Regular Exercise: No, not a regular routine. He does some body weight exercises but no cardio  Sunscreen Use: Yes  Healthy Diet: Yes, generally speaking yes   Dental Visits Regularly: Yes  Sexually active: Yes      Immunization History   Administered Date(s) Administered     DTaP, historic 04/13/1995, 06/19/1995, 02/21/1996, 06/11/1996, 01/13/2000     HPV Quadrivalent 10/19/2011, 04/26/2012, 10/01/2012     Hep A, historic 03/14/2007, 06/13/2008     Hep B, historic 01/03/1995, 02/21/1995, 11/02/2006     HiB, historic,unspecified 02/21/1995, 04/13/1995, 06/19/1995     IPV 02/21/1995, 04/13/1995, 06/19/1995, 01/13/2000     Influenza O9c2-12, 11/19/2009     Influenza, inj, historic,unspecified 10/21/2004, 11/03/2007, 10/18/2008, 09/23/2009, 11/03/2010, 11/08/2015     Influenza, seasonal,quad inj 6-35 mos 10/19/2011, 10/01/2012     Influenza,seasonal quad, PF 10/17/2013     MMR 04/04/1996, 01/13/2000     Meningococcal MCV4P 11/02/2006, 03/25/2013     Tdap 03/14/2007, 07/23/2018     Varicella 09/18/1995, 11/02/2006     Immunization status: tetanus booster         Current Outpatient Prescriptions   Medication Sig  Dispense Refill     albuterol (PROAIR HFA;PROVENTIL HFA;VENTOLIN HFA) 90 mcg/actuation inhaler Inhale 2 puffs every 6 (six) hours as needed for wheezing. 2 Inhaler 0     methylphenidate (CONCERTA) 36 MG CR tablet Take 2 tablets (72 mg total) by mouth every morning. 60 tablet 0     betamethasone valerate (VALISONE) 0.1 % ointment Apply every night for up to 2 wks for rash on the top of the feet.  **DO NOT SHARE**  **DO NOT USE ELSEWHERE** 30 g 1     No current facility-administered medications for this visit.      No past medical history on file.  Past Surgical History:   Procedure Laterality Date     INGUINAL HERNIA REPAIR Left     pt states he had this at age 1-2 yrs old     Latex  Family History   Problem Relation Age of Onset     No Medical Problems Mother      Neuropathy Father      He doesn't have feeling in his legs.  8/8/16     No Medical Problems Sister      Social History     Social History     Marital status: Single     Spouse name: N/A     Number of children: N/A     Years of education: N/A     Occupational History     Not on file.     Social History Main Topics     Smoking status: Never Smoker     Smokeless tobacco: Never Used     Alcohol use 1.2 oz/week     1 Cans of beer, 1 Shots of liquor per week     Drug use: Yes     Special: Marijuana      Comment: Used legally in [place] but not in [place]     Sexual activity: Yes     Partners: Female     Other Topics Concern     Not on file     Social History Narrative       Review of Systems  General:  Negative except as noted above  Eyes: Negative except as noted above  Ears/Nose/Throat: Negative except as noted above  Cardiovascular: Negative except as noted above  Respiratory:  Negative except as noted above  Gastrointestinal:  Negative except as noted above  Musculoskeletal:  Negative except as noted above  Skin: Negative except as noted above  Neurologic: Negative except as noted above  Psychiatric: Negative except as noted above  Endocrine: Negative  "except as noted above  Heme/Lymphatic: Negative except as noted above   Allergic/Immunologic: Negative except as noted above      Objective:      Vitals:    07/23/18 0837   BP: 120/66   Pulse: 78   Weight: 131 lb (59.4 kg)   Height: 5' 4\" (1.626 m)     Wt Readings from Last 3 Encounters:   07/23/18 131 lb (59.4 kg)   08/17/17 139 lb 6.4 oz (63.2 kg)   08/08/16 146 lb 6.4 oz (66.4 kg)     Body mass index is 22.49 kg/(m^2).    Physical Exam:  Constitutional: Well developed, well nourished, no acute distress.  HEENT: normocephalic/atraumatic, PERRLA/EOMI, TMs: Gray, normal light reflex, no nasal discharge.  Oral mucosa: moist; pharynx is mildly erythematous, no exudate   Neck: No masses/thyromegaly/bruits. Mild left cervical adenopathy   Lungs: clear bilaterally  Heart: regular rate and rhythm, no murmurs/gallops/rubs  Abdomen: Normal bowel sounds, soft, non-tender, non-distended, no masses, neg Dos Santos's/McBurney's, no rebound/guarding  Lymphatics: no supraclavicular/axillary/epitrochlear/inguinal LAD. No edema.  Neuro: A&O x 3, CN II-XII intact  Skin: no rashes or lesions.  Psych: Behavior appropriate, engaging.  Thought processes congruent, non-tangential        "

## 2021-06-24 NOTE — PROGRESS NOTES
" Patient ID: William Bansal is a 23 y.o. male.  /64   Pulse 67   Ht 5' 4\" (1.626 m)   Wt 131 lb 1.6 oz (59.5 kg)   SpO2 99%   BMI 22.50 kg/m      Assessment/Plan:                   Diagnoses and all orders for this visit:    Routine general medical examination at a health care facility  -     Lipid Cascade FASTING  -     Glucose    Mild intermittent asthma without complication  -     albuterol (PROAIR HFA;PROVENTIL HFA;VENTOLIN HFA) 90 mcg/actuation inhaler  Dispense: 1 Inhaler; Refill: 3    Scalp cyst    Other orders  -     Cancel: Vitamin D, Total (25-Hydroxy)        DISCUSSION  Obtain labs as above.  Refill albuterol inhaler.  Return to have scalp cyst excised if it becomes a concern provided reassurance overall otherwise.  Subjective:     HPI    William Bansal is a 23 y.o. male who is here today to establish care.  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  He is studying [employer].  He plans to go into energy policy creation.  He may attend graduate school possibly even law school.  His most significant underlying health concern is asthma.  He has mild intermittent asthma and uses albuterol.  He does not use controller medication.  He does have triggers including air quality especially more prevalent in [place], with no exercise triggered.  Previously had been on a controller medication.  Overall happy with his asthma management.  Asthma control test score is 22.    Other medical history significant for ADHD combined type, migraine headaches and eczema.  No history of any surgeries.    He is not aware of any significant medical problems in his family.    He does report that he has some lumps on his scalp.        Review of Systems  Complete review of systems is obtained.  Other than the specific considerations noted above complete review of systems is negative.          Objective:   Medications:  Current Outpatient Medications   Medication " "Sig     albuterol (PROAIR HFA;PROVENTIL HFA;VENTOLIN HFA) 90 mcg/actuation inhaler Inhale 2 puffs every 6 (six) hours as needed for wheezing.     betamethasone valerate (VALISONE) 0.1 % ointment Apply every night for up to 2 wks for rash on the top of the feet.  **DO NOT SHARE**  **DO NOT USE ELSEWHERE**       Allergies:  Allergies   Allergen Reactions     Latex Hives       Tobacco:   reports that  has never smoked. he has never used smokeless tobacco.     Physical Exam          /64   Pulse 67   Ht 5' 4\" (1.626 m)   Wt 131 lb 1.6 oz (59.5 kg)   SpO2 99%   BMI 22.50 kg/m            General Appearance:    Alert, cooperative, no distress   Eyes:   No scleral icterus or conjunctival irritation       Ears:    Normal TM's and external ear canals, both ears   Throat:   Lips, mucosa, and tongue normal; teeth and gums normal   Neck:   Supple, symmetrical, trachea midline, no adenopathy;        thyroid:  No enlargement/tenderness/nodules   Lungs:     Clear to auscultation bilaterally, respirations unlabored, no wheezes or crackles   Heart:    Regular rate and rhythm,  No murmur   Abdomen:    Soft, no distention, no tenderness on palpation, no masses, no organomegaly     Extremities:  No edema, no joint swelling or redness, no evidence of any injuries   Skin:  No concerning skin findings, no suspicious moles, no rashes   Neurologic:  On gross examination there is no motor or sensory deficit.  Patient walks with a normal gait                         "

## 2021-06-25 NOTE — PROGRESS NOTES
Progress Notes by Nigel Hernandez at 8/17/2017  8:00 AM     Author: Nigel Hernandez Service: -- Author Type: Nurse Practitioner    Filed: 8/28/2017 12:32 AM Encounter Date: 8/17/2017 Status: Signed    : [facility]    Date of Service: 8/17/2017  Primary Provider: Nigel Blanton MD    Patient Care Team:  Nigel Blanton MD as PCP - General (Internal Medicine)     ______________________________________________________________________     Patient's Pharmacy:    [facility] [id number] [address] [address]  Phone: [phone] Fax: [phone]     Patient's Insurance:    Payor: dax [employer] / Plan: [employer] / Product Type: PPO/POS/FFS /     ______________________________________________________________________    Assessment:    1. Chest pain    2. Left groin pain    3. Asthma       ______________________________________________________________________      PHQ-2 Total Score: 0 (8/17/2017  8:10 AM)     Plan:  Patient Instructions   1. Check BMP, CBC, EKG, Spirometry w/o Bronchodilator  2. Albuterol Inhaler - Refilled at this visit, use this today as directed for shortness of breath, chest tightness  3. Recommend taking your ICS (Flovent or Qvar) 1-2 puffs once-twice daily, rinse mouth after use.  4. Recommend establishing care with a new PCP as Dr. Blanton is no longer at this clinic.      ______________________________________________________________________     William LOC Bansal is 21 y.o. male who comes in today for:    Chief Complaint   Patient presents with   ? Chest Pain     tingling sensation by his heart, also have lower abdominal pain to where he couldnt stand due to pain. He did have hernia surgery when he was kid where the pain is at in the abdomen.        Patient Active Problem List   Diagnosis   ? Migraine Headache   ? Short Stature   ? ADHD, Combined Type   ?  Superior Glenoid Labrum Lesion   ? Asthma - mild, persistent asthma   ? Allergic Rhinitis   ? Eczema   ? Hypovitaminosis D - Vitamin [place] almost certainly [place]     No past medical history on file.    Past Surgical History:   Procedure Laterality Date   ? INGUINAL HERNIA REPAIR Left     pt states he had this at age 1-2 yrs old       Current Outpatient Prescriptions   Medication Sig   ? albuterol (PROAIR HFA;PROVENTIL HFA;VENTOLIN HFA) 90 mcg/actuation inhaler Inhale 2 puffs every 6 (six) hours as needed for wheezing.   ? betamethasone valerate (VALISONE) 0.1 % ointment Apply every night for up to 2 wks for rash on the top of the feet.  **DO NOT SHARE**  **DO NOT USE ELSEWHERE**   ? methylphenidate (CONCERTA) 36 MG CR tablet Take 2 tablets (72 mg total) by mouth every morning.     Social History     Social History   ? Marital status: Single     Spouse name: N/A   ? Number of children: N/A   ? Years of education: N/A     Occupational History   ? Not on file.     Social History Main Topics   ? Smoking status: Never Smoker   ? Smokeless tobacco: Never Used   ? Alcohol use 1.2 oz/week     1 Cans of beer, 1 Shots of liquor per week   ? Drug use: Yes     Special: Marijuana      Comment: Used legally in [place] but not in [place]   ? Sexual activity: Yes     Partners: Female     Other Topics Concern   ? Not on file     Social History Narrative     ______________________________________________________________________     History of present illness: William Bansal is a pleasant 21 y.o. male who presents in clinic today for evaluation of chest wall tingling and lower abdominal pain.  Tingling sensation over left side chest last couple years occasionally, mostly when straining with yelling, singing loudly, running.  It would feel like a squeezing sensation with gradual release until resolution without recurrence for a number of weeks to months.  First noticed at age 15 yrs, then not  "noticed again until age 17 yrs.  The intensity is variable from weak to more noticeable.  He states that for the last 4 years this has been intermittent occurring approximately 1 time per month, now more recently over the last month he has noticed them more frequent with the last on last Monday and prior to that about 2-3 weeks ago. He reports that he had reviewed his My Chart records and noted that it had mentioned that he had a heart murmur, but upon my review, I am unable to locate that finding in his records.  He has previously had sports physicals, last was around age 18-19 yrs old for his 1st year of college sports and was not told if this was found at that time either. He reports that on Monday of this week, he noted that during one of these left-sided chest tingling episodes it seemed to radiate from his chest into his left upper arm area.  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  He has not treated his symptoms as they are rather quick sensations that resolve spontaneously within 5-10 seconds.  He reports associated symptoms of lightheadedness, shortness of breath, but doesn't want to take a deep breath as doesn't want it to reoccur as he can get the same pain with a deeper breath, so will do some shallow breathing.  On a side note, he notes that he has had to use his albuterol inhaler recently up to 3 times per week with onset of allergies and hay fever symptoms over the last few weeks.  Lastly, he complains of some LLQ abdominal pain at times and last Friday he had a sharp pain that woke him up from his sleep.  His pain was about a 3-4/10, this lasted a good 30 minutes, where usually it would last 30 seconds.  He would typically get this \"quarterly.\"  He has not done anything to treat this previously.  He has a history of previous hernia repair LLQ vs inguinal, he is not sure and this is not noted in his " "chart.  He seems to feel that this is a muscular feeling with tightness and makes him want to be bent over and not standing straight upright.  He plays baseball at least twice weekly, he is right hand dominant, denies any recent injuries, however, he did tear his right labrum in his shoulder about 4 years ago and has not had it repaired.  He reports recent URI symptoms with cold sweats, fever up to 100.4 F, and general achy feeling on Tuesday and lasted into early Wednesday.  Today, he is feeling a bit overly tired and admits that this feeling has persisted over the last couple of weeks.    *This is a former patient of Dr. Nigel Blanton.    Review of systems:   On ROS, the patient denies abdominal bloating, diarrhea, constipation.   Positive for symptoms as noted in the HPI.    ______________________________________________________________________    Wt Readings from Last 3 Encounters:   08/17/17 139 lb 6.4 oz (63.2 kg)   08/08/16 146 lb 6.4 oz (66.4 kg)   12/21/15 138 lb 12.8 oz (63 kg)     BP Readings from Last 3 Encounters:   08/17/17 98/60   08/08/16 98/60   12/21/15 118/60     BP 98/60 (Patient Site: Right Arm, Patient Position: Sitting, Cuff Size: Adult Regular)  Pulse 66  Ht 5' 4\" (1.626 m)  Wt 139 lb 6.4 oz (63.2 kg)  SpO2 96%  BMI 23.93 kg/m2     Physical Exam:  General Appearance: Alert, cooperative, no distress, appears stated age  Head: Normocephalic, without obvious abnormality, atraumatic  Eyes: PERRL, conjunctiva/corneas clear, EOM's intact  Ears: Normal TM's and external ear canals, both ears  Nose: Nares normal, septum midline,mucosa normal, no drainage  Throat: Lips, mucosa, and tongue normal; teeth and gums normal, no erythema, exudate, or thrush  Neck: Supple, symmetrical, trachea midline, no adenopathy;  thyroid: not enlarged, symmetric, no tenderness/mass/nodules  Lungs: Clear to auscultation bilaterally, respirations unlabored  Heart: Regular rate and rhythm, S1 and S2 normal, no murmur, " rub, or gallop  Abdomen: Soft, non-tender, bowel sounds active all four quadrants,  no masses, no organomegaly  Musculoskeletal: Normal range of motion. No joint swelling or deformity.   : No inguinal hernia appreciated, bilaterally. Penis, scrotum/testicles are normal. No erythema or swelling noted.  Extremities: Extremities normal, atraumatic, no cyanosis or edema  Skin: Skin color, texture, turgor normal, no rashes or lesions  Lymph nodes: Cervical & supraclavicular nodes normal  Neurologic: He is alert & oriented x 3. He has normal gait.  Psychiatric: He has a normal mood and affect.     Diagnostics:  Results for orders placed or performed in visit on 08/17/17   Basic Metabolic Panel   Result Value Ref Range    Sodium 139 136 - 145 mmol/L    Potassium 4.5 3.5 - 5.0 mmol/L    Chloride 102 98 - 107 mmol/L    CO2 27 22 - 31 mmol/L    Anion Gap, Calculation 10 5 - 18 mmol/L    Glucose 89 70 - 125 mg/dL    Calcium 10.2 8.5 - 10.5 mg/dL    BUN 23 (H) 8 - 22 mg/dL    Creatinine 1.29 0.70 - 1.30 mg/dL    GFR MDRD Af Amer >60 >60 mL/min/1.73m2    GFR MDRD Non Af Amer >60 >60 mL/min/1.73m2   HM2(CBC w/o Differential)   Result Value Ref Range    WBC 7.9 4.0 - 11.0 thou/uL    RBC 5.80 4.40 - 6.20 mill/uL    Hemoglobin 18.1 (H) 14.0 - 18.0 g/dL    Hematocrit 53.4 40.0 - 54.0 %    MCV 92 80 - 100 fL    MCH 31.3 27.0 - 34.0 pg    MCHC 33.9 32.0 - 36.0 g/dL    RDW 10.5 (L) 11.0 - 14.5 %    Platelets 259 140 - 440 thou/uL    MPV 7.7 7.0 - 10.0 fL   Electrocardiogram Perform - Clinic   Result Value Ref Range    SYSTOLIC BLOOD PRESSURE  mmHg    DIASTOLIC BLOOD PRESSURE  mmHg    VENTRICULAR RATE 57 BPM    ATRIAL RATE 57 BPM    P-R INTERVAL 174 ms    QRS DURATION 100 ms    Q-T INTERVAL 398 ms    QTC CALCULATION (BEZET) 387 ms    P Axis 64 degrees    R AXIS 83 degrees    T AXIS 46 degrees    MUSE DIAGNOSIS       Sinus bradycardia  Otherwise normal ECG  No previous ECGs available  Confirmed by SONNY TELLO MD LOC:[facility] ([id number]) on  8/17/2017 10:10:42 AM       Office Spirometry Results 8/17/2017:   FEV1/FVC is 86% predicted and is normal   FEV1 is 76% predicted and is reduced.   FVC is 90% predicted and normal.    Impression:  Spirometry is abnormal and consistent with mild obstructive disease.      Nigel Hernandez [facility]  Internal Medicine  [facility]     Return if symptoms worsen or fail to improve.

## 2021-07-27 ENCOUNTER — TRANSFERRED RECORDS (OUTPATIENT)
Dept: HEALTH INFORMATION MANAGEMENT | Facility: CLINIC | Age: 26
End: 2021-07-27

## 2021-08-21 ENCOUNTER — HEALTH MAINTENANCE LETTER (OUTPATIENT)
Age: 26
End: 2021-08-21

## 2021-10-16 ENCOUNTER — HEALTH MAINTENANCE LETTER (OUTPATIENT)
Age: 26
End: 2021-10-16

## 2022-04-27 ENCOUNTER — E-VISIT (OUTPATIENT)
Dept: FAMILY MEDICINE | Facility: CLINIC | Age: 27
End: 2022-04-27
Payer: COMMERCIAL

## 2022-04-27 DIAGNOSIS — R05.9 COUGH: Primary | ICD-10-CM

## 2022-04-27 PROCEDURE — 99207 PR NON-BILLABLE SERV PER CHARTING: CPT | Performed by: STUDENT IN AN ORGANIZED HEALTH CARE EDUCATION/TRAINING PROGRAM

## 2022-06-30 ENCOUNTER — OFFICE VISIT (OUTPATIENT)
Dept: FAMILY MEDICINE | Facility: CLINIC | Age: 27
End: 2022-06-30
Payer: COMMERCIAL

## 2022-06-30 VITALS
BODY MASS INDEX: 27.81 KG/M2 | HEART RATE: 70 BPM | WEIGHT: 162 LBS | RESPIRATION RATE: 20 BRPM | DIASTOLIC BLOOD PRESSURE: 61 MMHG | OXYGEN SATURATION: 96 % | SYSTOLIC BLOOD PRESSURE: 105 MMHG

## 2022-06-30 DIAGNOSIS — J30.1 NON-SEASONAL ALLERGIC RHINITIS DUE TO POLLEN: ICD-10-CM

## 2022-06-30 DIAGNOSIS — J45.21 MILD INTERMITTENT ASTHMA WITH ACUTE EXACERBATION: ICD-10-CM

## 2022-06-30 PROCEDURE — 99214 OFFICE O/P EST MOD 30 MIN: CPT | Performed by: FAMILY MEDICINE

## 2022-06-30 RX ORDER — PREDNISONE 20 MG/1
40 TABLET ORAL DAILY
Qty: 10 TABLET | Refills: 0 | Status: SHIPPED | OUTPATIENT
Start: 2022-06-30 | End: 2022-07-05

## 2022-06-30 RX ORDER — ALBUTEROL SULFATE 90 UG/1
AEROSOL, METERED RESPIRATORY (INHALATION)
Qty: 8.5 G | Refills: 11 | Status: SHIPPED | OUTPATIENT
Start: 2022-06-30 | End: 2023-05-21

## 2022-06-30 RX ORDER — MONTELUKAST SODIUM 10 MG/1
10 TABLET ORAL DAILY
Qty: 90 TABLET | Refills: 3 | Status: SHIPPED | OUTPATIENT
Start: 2022-06-30 | End: 2022-10-24

## 2022-06-30 ASSESSMENT — ASTHMA QUESTIONNAIRES
QUESTION_5 LAST FOUR WEEKS HOW WOULD YOU RATE YOUR ASTHMA CONTROL: SOMEWHAT CONTROLLED
ACT_TOTALSCORE: 13
QUESTION_3 LAST FOUR WEEKS HOW OFTEN DID YOUR ASTHMA SYMPTOMS (WHEEZING, COUGHING, SHORTNESS OF BREATH, CHEST TIGHTNESS OR PAIN) WAKE YOU UP AT NIGHT OR EARLIER THAN USUAL IN THE MORNING: ONCE A WEEK
QUESTION_2 LAST FOUR WEEKS HOW OFTEN HAVE YOU HAD SHORTNESS OF BREATH: MORE THAN ONCE A DAY
QUESTION_1 LAST FOUR WEEKS HOW MUCH OF THE TIME DID YOUR ASTHMA KEEP YOU FROM GETTING AS MUCH DONE AT WORK, SCHOOL OR AT HOME: A LITTLE OF THE TIME
QUESTION_4 LAST FOUR WEEKS HOW OFTEN HAVE YOU USED YOUR RESCUE INHALER OR NEBULIZER MEDICATION (SUCH AS ALBUTEROL): ONE OR TWO TIMES PER DAY
ACT_TOTALSCORE: 13

## 2022-06-30 ASSESSMENT — PATIENT HEALTH QUESTIONNAIRE - PHQ9
SUM OF ALL RESPONSES TO PHQ QUESTIONS 1-9: 1
10. IF YOU CHECKED OFF ANY PROBLEMS, HOW DIFFICULT HAVE THESE PROBLEMS MADE IT FOR YOU TO DO YOUR WORK, TAKE CARE OF THINGS AT HOME, OR GET ALONG WITH OTHER PEOPLE: NOT DIFFICULT AT ALL
SUM OF ALL RESPONSES TO PHQ QUESTIONS 1-9: 1

## 2022-06-30 NOTE — PROGRESS NOTES
Assessment & Plan     Mild intermittent asthma with acute exacerbation  Refilled albuterol to be used as needed along with a 5-day burst of prednisone.  If symptoms return after prednisone burst, consider follow-up for discussion of daily controller inhaler.  Also referred to allergist for discussion of multiple environmental allergies and options in this regard.  - albuterol (PROAIR HFA/PROVENTIL HFA/VENTOLIN HFA) 108 (90 Base) MCG/ACT inhaler; TAKE 2 PUFFS BY MOUTH EVERY 6 HOURS AS NEEDED FOR WHEEZE  - Adult Allergy/Asthma Referral; Future  - predniSONE (DELTASONE) 20 MG tablet; Take 2 tablets (40 mg) by mouth daily for 5 days    Non-seasonal allergic rhinitis due to pollen  Recommended that patient restart Singulair along with continuation of his Allegra.  This was stopped for unclear reasons.  Also recommended referral to allergist, especially given reported possible peanut allergy.  - montelukast (SINGULAIR) 10 MG tablet; Take 1 tablet (10 mg) by mouth daily  - Adult Allergy/Asthma Referral; Future                 Return in about 4 weeks (around 7/28/2022) for Routine preventive.    Radha Flores MD  [facility]    Shamar Chau is a 26 year old, presenting for the following health issues:  Asthma (Follow up, ACT score: )      History of Present Illness     Asthma:  He presents for follow up of asthma.  He has some cough, some wheezing, and some shortness of breath. He is using a relief medication 2-3 times per day. He does not have a controller medication. Patient is aware of the following triggers: same as previous visit, animal dander, dust mites, exercise or sports, pollens and smoke. The patient has not had a visit to the Emergency Room, Urgent Care or Hospital due to asthma since the last clinic visit.     He eats 0-1 servings of fruits and vegetables daily.He consumes 1 sweetened beverage(s) daily.He exercises with enough effort to increase his heart rate 30 to 60  "minutes per day.  He exercises with enough effort to increase his heart rate 5 days per week.   He is taking medications regularly.    Today's PHQ-9         PHQ-9 Total Score: 1    PHQ-9 Q9 Thoughts of better off dead/self-harm past 2 weeks :   Not at all    How difficult have these problems made it for you to do your work, take care of things at home, or get along with other people: Not difficult at all       Asthma Follow-Up  Was ACT completed today?    Yes    ACT Total Scores 6/30/2022   ACT TOTAL SCORE (Goal Greater than or Equal to 20) 13   In the past 12 months, how many times did you visit the emergency room for your asthma without being admitted to the hospital? 0   In the past 12 months, how many times were you hospitalized overnight because of your asthma? 0         How many days per week do you miss taking your asthma controller medication?  I do not have an asthma controller medication    Please describe any recent triggers for your asthma: dust mites, pollens, animal dander and exercise or sports    Have you had any Emergency Room Visits, Urgent Care Visits, or Hospital Admissions since your last office visit?  No    Patient presents today for discussion of asthma.  He states that he had COVID in January and did okay with this, but 2 months ago was cleaning [place] brush [place] and seems to have had increased difficulty breathing since that time.  He coughs and sneezes fairly frequently, has watery eyes and runny nose.  He denies any fevers.  He thinks he has multiple environmental allergies, thinks he was allergy tested as a child.  He also states that he develops hives when he touches peanuts, and so avoids all peanut products.  He can tolerate some other tree nuts.  He thinks he is allergic to pollen, dust, latex, and possibly peanuts.  He does not carry an EpiPen.  He has never had lip, throat, or tongue swelling.  He describes a \"burning\" in his chest.  He has previously been diagnosed with " exercise-induced asthma.    Review of Systems   Constitutional, HEENT, cardiovascular, pulmonary, gi and gu systems are negative, except as otherwise noted.      Objective    /61 (BP Location: Left arm, Patient Position: Sitting, Cuff Size: Adult Regular)   Pulse 70   Resp 20   Wt 73.5 kg (162 lb)   SpO2 96%   BMI 27.81 kg/m    Body mass index is 27.81 kg/m .  Physical Exam   GENERAL: healthy, alert and no distress  EYES: Eyes grossly normal to inspection, PERRL and conjunctivae and sclerae normal  HENT: ear canals and TM's normal, nose and mouth without ulcers or lesions  NECK: no adenopathy, no asymmetry, masses, or scars and thyroid normal to palpation  RESP: lungs clear to auscultation - no rales, rhonchi or wheezes; mild wheeze with forced expiration  CV: regular rate and rhythm, normal S1 S2, no S3 or S4, no murmur, click or rub, no peripheral edema and peripheral pulses strong  ABDOMEN: soft, nontender, no hepatosplenomegaly, no masses and bowel sounds normal  MS: no gross musculoskeletal defects noted, no edema  SKIN: no suspicious lesions or rashes  NEURO: Normal strength and tone, mentation intact and speech normal  PSYCH: mentation appears normal, affect normal/bright, slightly anxious    Diagnostics: None

## 2022-07-03 PROBLEM — J45.21 MILD INTERMITTENT ASTHMA WITH ACUTE EXACERBATION: Status: ACTIVE | Noted: 2022-07-03

## 2022-09-25 ENCOUNTER — HEALTH MAINTENANCE LETTER (OUTPATIENT)
Age: 27
End: 2022-09-25

## 2022-10-24 ENCOUNTER — OFFICE VISIT (OUTPATIENT)
Dept: ALLERGY | Facility: CLINIC | Age: 27
End: 2022-10-24
Attending: FAMILY MEDICINE
Payer: COMMERCIAL

## 2022-10-24 VITALS — HEIGHT: 64 IN | HEART RATE: 68 BPM | WEIGHT: 156.9 LBS | BODY MASS INDEX: 26.79 KG/M2 | OXYGEN SATURATION: 95 %

## 2022-10-24 DIAGNOSIS — J30.1 NON-SEASONAL ALLERGIC RHINITIS DUE TO POLLEN: ICD-10-CM

## 2022-10-24 DIAGNOSIS — Z91.010 PEANUT ALLERGY: ICD-10-CM

## 2022-10-24 DIAGNOSIS — J45.30 MILD PERSISTENT ASTHMA WITHOUT COMPLICATION: Primary | ICD-10-CM

## 2022-10-24 DIAGNOSIS — J45.21 MILD INTERMITTENT ASTHMA WITH ACUTE EXACERBATION: ICD-10-CM

## 2022-10-24 PROCEDURE — 99204 OFFICE O/P NEW MOD 45 MIN: CPT | Performed by: ALLERGY & IMMUNOLOGY

## 2022-10-24 PROCEDURE — 36415 COLL VENOUS BLD VENIPUNCTURE: CPT | Performed by: ALLERGY & IMMUNOLOGY

## 2022-10-24 PROCEDURE — 86008 ALLG SPEC IGE RECOMB EA: CPT | Mod: 59 | Performed by: ALLERGY & IMMUNOLOGY

## 2022-10-24 PROCEDURE — 86003 ALLG SPEC IGE CRUDE XTRC EA: CPT | Performed by: ALLERGY & IMMUNOLOGY

## 2022-10-24 RX ORDER — MONTELUKAST SODIUM 10 MG/1
10 TABLET ORAL DAILY
Qty: 90 TABLET | Refills: 3 | Status: SHIPPED | OUTPATIENT
Start: 2022-10-24 | End: 2023-08-25

## 2022-10-24 RX ORDER — FLUTICASONE PROPIONATE AND SALMETEROL XINAFOATE 230; 21 UG/1; UG/1
2 AEROSOL, METERED RESPIRATORY (INHALATION) 2 TIMES DAILY
Qty: 12 G | Refills: 3 | Status: SHIPPED | OUTPATIENT
Start: 2022-10-24 | End: 2023-08-25

## 2022-10-24 RX ORDER — AZELASTINE 1 MG/ML
2 SPRAY, METERED NASAL 2 TIMES DAILY
Qty: 30 ML | Refills: 3 | Status: SHIPPED | OUTPATIENT
Start: 2022-10-24 | End: 2022-11-28

## 2022-10-24 RX ORDER — EPINEPHRINE 0.3 MG/.3ML
INJECTION SUBCUTANEOUS
Qty: 4 EACH | Refills: 0 | Status: SHIPPED | OUTPATIENT
Start: 2022-10-24 | End: 2023-08-25

## 2022-10-24 ASSESSMENT — ASTHMA QUESTIONNAIRES
QUESTION_5 LAST FOUR WEEKS HOW WOULD YOU RATE YOUR ASTHMA CONTROL: SOMEWHAT CONTROLLED
ACT_TOTALSCORE: 14
QUESTION_4 LAST FOUR WEEKS HOW OFTEN HAVE YOU USED YOUR RESCUE INHALER OR NEBULIZER MEDICATION (SUCH AS ALBUTEROL): ONE OR TWO TIMES PER DAY
QUESTION_1 LAST FOUR WEEKS HOW MUCH OF THE TIME DID YOUR ASTHMA KEEP YOU FROM GETTING AS MUCH DONE AT WORK, SCHOOL OR AT HOME: NONE OF THE TIME
QUESTION_3 LAST FOUR WEEKS HOW OFTEN DID YOUR ASTHMA SYMPTOMS (WHEEZING, COUGHING, SHORTNESS OF BREATH, CHEST TIGHTNESS OR PAIN) WAKE YOU UP AT NIGHT OR EARLIER THAN USUAL IN THE MORNING: ONCE A WEEK
QUESTION_2 LAST FOUR WEEKS HOW OFTEN HAVE YOU HAD SHORTNESS OF BREATH: MORE THAN ONCE A DAY
ACT_TOTALSCORE: 14

## 2022-10-24 NOTE — PROGRESS NOTES
"Shamar Chau is a 26 year old, presenting for the following health issues:  Asthma Consult and Allergy Consult      HPI     Chief complaint: Asthma, allergies    History of present illness: This is a pleasant 26-year-old gentleman who was asked to see for evaluation by Dr. Flores.  Patient states that he has had asthma for a long time but it worsened in January of this year after having COVID.  He states his allergies worsened as well.  Allergy symptoms consist of nasal congestion and drainage.  Asthma symptoms consist of shortness of breath.  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  Now he states that even short distances with his dog will cause him to become breathless.  Occasionally his breathing does wake him up from sleep.  He does note some coughing at times as well.  He has an albuterol inhaler to use and he does have to use that frequently throughout the day.  He also uses montelukast.  Previously was prescribed Flovent and uses it only as needed.  He does use a daily antihistamine of Allegra.  He does not use any nasal sprays regularly.  He does note that he had a peanut allergy when he was a baby.  He was tested for allergy at that time.  He states he was tested for peanut and was negative.  He states that peanut comes into contact with the skin, he will develop hives.  He does not carry an epinephrine device.  No hospitalizations or emergency room visits for asthma.    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      Objective    Pulse 68   Ht 1.626 m (5' 4\")   Wt 71.2 kg (156 lb 14.4 oz)   " SpO2 95%   BMI 26.93 kg/m    Body mass index is 26.93 kg/m .  Physical Exam   Gen: Pleasant male not in acute distress  HEENT: Eyes no erythema of the bulbar or palpebral conjunctiva, no edema. Ears: No deformities or lesions Nose: No congestion, mucosa normal. Mouth: Throat clear, no lip or tongue edema.   Cardiac: Regular rate and rhythm, no murmurs, rubs or gallops  Respiratory: Clear to auscultation bilaterally, no adventitious breath sounds  Lymph: No visible supraclavicular or cervical lymphadenopathy  Skin: No rashes or lesions  Psych: Alert and oriented times 3    Impression report and plan:  1.  Allergic rhinitis  2.  Mild persistent asthma  3.  Peanut allergy    Check specific IgE to peanut and peanut component panel.  Anaphylaxis action provided and reviewed.  Epinephrine device prescribed.    For his allergies, I would like to allergy tested and he took his Allegra yesterday.  For this reason I would like to start him on Advair 230/21 2 puffs twice daily.  Chamber was discussed but was not provided today.  I want him to follow in 6 to 8 weeks and allergy test at that time.  Perform spirometry at that time as well if possible.  I think she would be a good allergy shot candidate once his asthma is better controlled.  Recommended Astelin nasal spray 2 sprays each nostril twice daily to help with congestion.

## 2022-10-24 NOTE — LETTER
10/24/2022         RE: William Bansal  [address] [place] [address]  [facility] [address]        Dear Colleague,    Thank you for referring your patient, William Bansal, to the [facility]. Please see a copy of my visit note below.        Shamar Chau is a 26 year old, presenting for the following health issues:  Asthma Consult and Allergy Consult      HPI     Chief complaint: Asthma, allergies    History of present illness: This is a pleasant 26-year-old gentleman who was asked to see for evaluation by Dr. Flores.  Patient states that he has had asthma for a long time but it worsened in January of this year after having COVID.  He states his allergies worsened as well.  Allergy symptoms consist of nasal congestion and drainage.  Asthma symptoms consist of shortness of breath.  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  Now he states that even short distances with his dog will cause him to become breathless.  Occasionally his breathing does wake him up from sleep.  He does note some coughing at times as well.  He has an albuterol inhaler to use and he does have to use that frequently throughout the day.  He also uses montelukast.  Previously was prescribed Flovent and uses it only as needed.  He does use a daily antihistamine of Allegra.  He does not use any nasal sprays regularly.  He does note that he had a peanut allergy when he was a baby.  He was tested for allergy at that time.  He states he was tested for peanut and was negative.  He states that peanut comes into contact with the skin, he will develop hives.  He does not carry an epinephrine device.  No hospitalizations or emergency room visits for asthma.    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     Objective    Pulse 68   Ht 1.626 m (5' 4\")   Wt 71.2 kg (156 lb 14.4 oz)   SpO2 95%   BMI 26.93 kg/m    Body mass index is 26.93 kg/m .  Physical Exam   Gen: Pleasant male not in acute distress  HEENT: Eyes no erythema of the bulbar or palpebral conjunctiva, no edema. Ears: No deformities or lesions Nose: No congestion, mucosa normal. Mouth: Throat clear, no lip or tongue edema.   Cardiac: Regular rate and rhythm, no murmurs, rubs or gallops  Respiratory: Clear to auscultation bilaterally, no adventitious breath sounds  Lymph: No visible supraclavicular or cervical lymphadenopathy  Skin: No rashes or lesions  Psych: Alert and oriented times 3    Impression report and plan:  1.  Allergic rhinitis  2.  Mild persistent asthma  3.  Peanut allergy    Check specific IgE to peanut and peanut component panel.  Anaphylaxis action provided and reviewed.  Epinephrine device prescribed.    For his allergies, I would like to allergy tested and he took his Allegra yesterday.  For this reason I would like to start him on Advair 230/21 2 puffs twice daily.  Chamber was discussed but was not provided today.  I want him to follow in 6 to 8 weeks and allergy test at that time.  Perform spirometry at that time as well if possible.  I think she would be a good allergy shot candidate once his asthma is better controlled.  Recommended Astelin nasal spray 2 sprays each nostril twice daily to help with congestion.                   Again, thank you for allowing me to participate in the care of your patient.        Sincerely,        Renee LAKHANI MD    "

## 2022-10-24 NOTE — PATIENT INSTRUCTIONS
Advair 2 puffs twice daily     Goal use less than 2 times per week outside for albuterol    Check peanut test    Follow in 1-2 months---off Allegra for 5 days prior    Montelukast    Astelin 2 sprays each nostril twice daily

## 2022-10-27 LAB
PEANUT (RARA H) 1 IGE QN: 0.12 KU(A)/L
PEANUT (RARA H) 2 IGE QN: 3.09 KU(A)/L
PEANUT (RARA H) 3 IGE QN: <0.1 KU(A)/L
PEANUT (RARA H) 6 IGE QN: 2.89 KU(A)/L
PEANUT (RARA H) 8 IGE QN: <0.1 KU(A)/L
PEANUT (RARA H) 9 IGE QN: 0.82 KU(A)/L
PEANUT IGE QN: 4.38 KU(A)/L

## 2022-11-23 DIAGNOSIS — J30.1 NON-SEASONAL ALLERGIC RHINITIS DUE TO POLLEN: ICD-10-CM

## 2022-11-28 RX ORDER — AZELASTINE HYDROCHLORIDE 137 UG/1
SPRAY, METERED NASAL
Qty: 30 ML | Refills: 0 | Status: SHIPPED | OUTPATIENT
Start: 2022-11-28 | End: 2023-08-25

## 2023-08-25 ENCOUNTER — OFFICE VISIT (OUTPATIENT)
Dept: FAMILY MEDICINE | Facility: CLINIC | Age: 28
End: 2023-08-25
Payer: COMMERCIAL

## 2023-08-25 VITALS
SYSTOLIC BLOOD PRESSURE: 88 MMHG | HEIGHT: 63 IN | TEMPERATURE: 97.7 F | OXYGEN SATURATION: 95 % | HEART RATE: 59 BPM | RESPIRATION RATE: 16 BRPM | BODY MASS INDEX: 25.27 KG/M2 | DIASTOLIC BLOOD PRESSURE: 54 MMHG | WEIGHT: 142.64 LBS

## 2023-08-25 DIAGNOSIS — J45.30 MILD PERSISTENT ASTHMA WITHOUT COMPLICATION: Primary | ICD-10-CM

## 2023-08-25 DIAGNOSIS — Z00.00 ANNUAL PHYSICAL EXAM: ICD-10-CM

## 2023-08-25 DIAGNOSIS — F90.2 ATTENTION DEFICIT HYPERACTIVITY DISORDER (ADHD), COMBINED TYPE: ICD-10-CM

## 2023-08-25 LAB
ALBUMIN SERPL BCG-MCNC: 4.7 G/DL (ref 3.5–5.2)
ALP SERPL-CCNC: 76 U/L (ref 40–129)
ALT SERPL W P-5'-P-CCNC: 25 U/L (ref 0–70)
AMPHETAMINES UR QL SCN: ABNORMAL
ANION GAP SERPL CALCULATED.3IONS-SCNC: 9 MMOL/L (ref 7–15)
AST SERPL W P-5'-P-CCNC: 26 U/L (ref 0–45)
BARBITURATES UR QL SCN: ABNORMAL
BENZODIAZ UR QL SCN: ABNORMAL
BILIRUB SERPL-MCNC: 0.9 MG/DL
BUN SERPL-MCNC: 19 MG/DL (ref 6–20)
BZE UR QL SCN: ABNORMAL
CALCIUM SERPL-MCNC: 9.7 MG/DL (ref 8.6–10)
CANNABINOIDS UR QL SCN: ABNORMAL
CHLORIDE SERPL-SCNC: 102 MMOL/L (ref 98–107)
CHOLEST SERPL-MCNC: 237 MG/DL
CREAT SERPL-MCNC: 1.4 MG/DL (ref 0.67–1.17)
CREAT UR-MCNC: 238 MG/DL
DEPRECATED HCO3 PLAS-SCNC: 27 MMOL/L (ref 22–29)
ERYTHROCYTE [DISTWIDTH] IN BLOOD BY AUTOMATED COUNT: 11.8 % (ref 10–15)
GFR SERPL CREATININE-BSD FRML MDRD: 70 ML/MIN/1.73M2
GLUCOSE SERPL-MCNC: 86 MG/DL (ref 70–99)
HCT VFR BLD AUTO: 45.7 % (ref 40–53)
HDLC SERPL-MCNC: 46 MG/DL
HGB BLD-MCNC: 16.2 G/DL (ref 13.3–17.7)
LDLC SERPL CALC-MCNC: 160 MG/DL
MCH RBC QN AUTO: 31.2 PG (ref 26.5–33)
MCHC RBC AUTO-ENTMCNC: 35.4 G/DL (ref 31.5–36.5)
MCV RBC AUTO: 88 FL (ref 78–100)
NONHDLC SERPL-MCNC: 191 MG/DL
OPIATES UR QL SCN: ABNORMAL
OXYCODONE UR QL: ABNORMAL
PCP QUAL URINE (ROCHE): ABNORMAL
PLATELET # BLD AUTO: 235 10E3/UL (ref 150–450)
POTASSIUM SERPL-SCNC: 5.2 MMOL/L (ref 3.4–5.3)
PROT SERPL-MCNC: 7.7 G/DL (ref 6.4–8.3)
RBC # BLD AUTO: 5.2 10E6/UL (ref 4.4–5.9)
SODIUM SERPL-SCNC: 138 MMOL/L (ref 136–145)
TRIGL SERPL-MCNC: 157 MG/DL
WBC # BLD AUTO: 5.8 10E3/UL (ref 4–11)

## 2023-08-25 PROCEDURE — 36415 COLL VENOUS BLD VENIPUNCTURE: CPT | Performed by: STUDENT IN AN ORGANIZED HEALTH CARE EDUCATION/TRAINING PROGRAM

## 2023-08-25 PROCEDURE — 80307 DRUG TEST PRSMV CHEM ANLYZR: CPT | Performed by: STUDENT IN AN ORGANIZED HEALTH CARE EDUCATION/TRAINING PROGRAM

## 2023-08-25 PROCEDURE — 0124A COVID-19 BIVALENT 12+ (PFIZER): CPT | Performed by: STUDENT IN AN ORGANIZED HEALTH CARE EDUCATION/TRAINING PROGRAM

## 2023-08-25 PROCEDURE — 80053 COMPREHEN METABOLIC PANEL: CPT | Performed by: STUDENT IN AN ORGANIZED HEALTH CARE EDUCATION/TRAINING PROGRAM

## 2023-08-25 PROCEDURE — 90677 PCV20 VACCINE IM: CPT | Performed by: STUDENT IN AN ORGANIZED HEALTH CARE EDUCATION/TRAINING PROGRAM

## 2023-08-25 PROCEDURE — 99214 OFFICE O/P EST MOD 30 MIN: CPT | Mod: 25 | Performed by: STUDENT IN AN ORGANIZED HEALTH CARE EDUCATION/TRAINING PROGRAM

## 2023-08-25 PROCEDURE — 90471 IMMUNIZATION ADMIN: CPT | Performed by: STUDENT IN AN ORGANIZED HEALTH CARE EDUCATION/TRAINING PROGRAM

## 2023-08-25 PROCEDURE — 80061 LIPID PANEL: CPT | Performed by: STUDENT IN AN ORGANIZED HEALTH CARE EDUCATION/TRAINING PROGRAM

## 2023-08-25 PROCEDURE — 85027 COMPLETE CBC AUTOMATED: CPT | Performed by: STUDENT IN AN ORGANIZED HEALTH CARE EDUCATION/TRAINING PROGRAM

## 2023-08-25 PROCEDURE — 99395 PREV VISIT EST AGE 18-39: CPT | Mod: 25 | Performed by: STUDENT IN AN ORGANIZED HEALTH CARE EDUCATION/TRAINING PROGRAM

## 2023-08-25 PROCEDURE — 91312 COVID-19 BIVALENT 12+ (PFIZER): CPT | Performed by: STUDENT IN AN ORGANIZED HEALTH CARE EDUCATION/TRAINING PROGRAM

## 2023-08-25 RX ORDER — LISDEXAMFETAMINE DIMESYLATE 30 MG/1
30 CAPSULE ORAL EVERY MORNING
Qty: 20 CAPSULE | Refills: 0 | Status: SHIPPED | OUTPATIENT
Start: 2023-08-25

## 2023-08-25 RX ORDER — FLUTICASONE PROPIONATE AND SALMETEROL XINAFOATE 230; 21 UG/1; UG/1
2 AEROSOL, METERED RESPIRATORY (INHALATION) 2 TIMES DAILY
Qty: 12 G | Refills: 3 | Status: SHIPPED | OUTPATIENT
Start: 2023-08-25 | End: 2024-02-15

## 2023-08-25 ASSESSMENT — ENCOUNTER SYMPTOMS
DIARRHEA: 0
EYE PAIN: 0
HEMATURIA: 0
FREQUENCY: 0
WEAKNESS: 0
NAUSEA: 0
PALPITATIONS: 0
CHILLS: 0
FEVER: 0
HEARTBURN: 0
HEMATOCHEZIA: 0
ABDOMINAL PAIN: 0
ARTHRALGIAS: 0
DIZZINESS: 0
COUGH: 0
SORE THROAT: 0
SHORTNESS OF BREATH: 0
JOINT SWELLING: 0
DYSURIA: 0
PARESTHESIAS: 0
HEADACHES: 0
MYALGIAS: 0
NERVOUS/ANXIOUS: 0
CONSTIPATION: 0

## 2023-08-25 ASSESSMENT — PATIENT HEALTH QUESTIONNAIRE - PHQ9
SUM OF ALL RESPONSES TO PHQ QUESTIONS 1-9: 4
10. IF YOU CHECKED OFF ANY PROBLEMS, HOW DIFFICULT HAVE THESE PROBLEMS MADE IT FOR YOU TO DO YOUR WORK, TAKE CARE OF THINGS AT HOME, OR GET ALONG WITH OTHER PEOPLE: NOT DIFFICULT AT ALL
SUM OF ALL RESPONSES TO PHQ QUESTIONS 1-9: 4

## 2023-08-25 ASSESSMENT — PAIN SCALES - GENERAL: PAINLEVEL: NO PAIN (0)

## 2023-08-25 ASSESSMENT — ASTHMA QUESTIONNAIRES: ACT_TOTALSCORE: 13

## 2023-08-25 NOTE — LETTER
[facility]  08/25/23  Patient: William Bansal  YOB: 1995  Medical Record Number: [id number]                                                                                  Non-Opioid Controlled Substance Agreement    This is an agreement between you and your provider regarding safe and appropriate use of controlled substances prescribed by your care team. Controlled substances are?medicines that can cause physical and mental dependence (abuse).     There are strict laws about having and using these medicines. We here at [facility] are  committed to working with you in your efforts to get better. To support you in this work, we'll help you schedule regular office appointments for medicine refills. If we must cancel or change your appointment for any reason, we'll make sure you have enough medicine to last until your next appointment.     As a Provider, I will:   Listen carefully to your concerns while treating you with respect.   Recommend a treatment plan that I believe is in your best interest and may involve therapies other than medicine.    Talk with you often about the possible benefits and the risk of harm of any medicine that we prescribe for you.  Assess the safety of this medicine and check how well it works.    Provide a plan on how to taper (discontinue or go off) using this medicine if the decision is made to stop its use.      ::  As a Patient, I understand controlled substances:     Are prescribed by my care provider to help me function or work and manage my condition(s).?  Are strong medicines and can cause serious side effects.     Need to be taken exactly as prescribed.?Combining controlled substances with certain medicines or chemicals (such as illegal drugs, alcohol, sedatives, sleeping pills, and benzodiazepines) can be dangerous or even fatal.? If I stop taking my medicines suddenly, I may have severe withdrawal symptoms.     The risks, benefits, and  side effects of these medicine(s) were explained to me. I agree that:    I will take part in other treatments as advised by my care team. This may be psychiatry or counseling, physical therapy, behavioral therapy, group treatment or a referral to specialist.    I will keep all my appointments and understand this is part of the monitoring of controlled substances.?My care team may require an office visit for EVERY controlled substance refill. If I miss appointments or don t follow instructions, my care team may stop my medicine    I will take my medicines as prescribed. I will not change the dose or schedule unless my care team tells me to. There will be no refills if I run out early.      I may be asked to come to the clinic and complete a urine drug test or complete a pill count. If I don t give a urine sample or participate in a pill count, the care team may stop my medicine.    I will only receive controlled substance prescriptions from this clinic. If I am treated by another provider, I will tell them that I am taking controlled substances and that I have a treatment agreement with this provider. I will inform my [facility] care team within one business day if I am given a prescription for any controlled substance by another healthcare provider. My [facility] care team can contact other providers and pharmacists about my use of any medicines.    It is up to me to make sure that I don't run out of my medicines on weekends or holidays.?If my care team is willing to refill my prescription without a visit, I must request refills only during office hours. Refills may take up to 3 business days to process. I will use one pharmacy to fill all my controlled substance prescriptions. I will notify the clinic about any changes to my insurance or medicine availability.    I am responsible for my prescriptions. If the medicine/prescription is lost, stolen or destroyed, it will not be replaced.?I also agree not  to share controlled substance medicines with anyone.     I am aware I should not use any illegal or recreational drugs. I agree not to drink alcohol unless my care team says I can.     If I enroll in the [place] Medical Cannabis program, I will tell my care team before my next refill.    I will tell my care team right away if I become pregnant, have a new medical problem treated outside of my regular clinic, or have a change in my medicines.     I understand that this medicine can affect my thinking, judgment and reaction time.? Alcohol and drugs affect the brain and body, which can affect the safety of my driving. Being under the influence of alcohol or drugs can affect my decision-making, behaviors, personal safety and the safety of others. Driving while impaired (DWI) can occur if a person is driving, operating or in physical control of a car, motorcycle, boat, snowmobile, ATV, motorbike, off-road vehicle or any other motor vehicle ([place] Statute 169A.20). I understand the risk if I choose to drive or operate any vehicle or machinery.    I understand that if I do not follow any of the conditions above, my prescriptions or treatment may be stopped or changed.   I agree that my provider, clinic care team and pharmacy may work with any city, state or federal law enforcement agency that investigates the misuse, sale or other diversion of my controlled medicine. I will allow my provider to discuss my care with, or share a copy of, this agreement with any other treating provider, pharmacy or emergency room where I receive care.     I have read this agreement and have asked questions about anything I did not understand.    ________________________________________________________  Patient Signature - William Bansal     ___________________                   Date     ________________________________________________________  Provider Signature - Daquan Vinson MD       ___________________                   Date      ________________________________________________________  Witness Signature (required if provider not present while patient signing)          ___________________                   Date

## 2023-08-25 NOTE — PROGRESS NOTES
Assessment and Plan   27-year-old male with past no history of ADHD and asthma who presents for follow-up regarding these as well as for annual physical exam.  Patient's asthma has been worse over the last 2 months since he ran a race.  Unclear how this has affected it.  Possibly due to worsening air quality.  I recommended he start controller inhaler he was previously prescribed Advair and refilled the prescription.  Continue to use albuterol as needed.  For his ADHD patient has not taken medication in several years.  On exam he is quite hyperactive and easily distractible.  I think he would benefit from medication therapy.  Seems as though Vyvanse would be covered under his plan we will start him on 30 mg daily and likely increase from there.    1. Mild persistent asthma without complication  - fluticasone-salmeterol (ADVAIR-HFA) 230-21 MCG/ACT inhaler; Inhale 2 puffs into the lungs 2 times daily  Dispense: 12 g; Refill: 3    2. Attention deficit hyperactivity disorder (ADHD), combined type  - lisdexamfetamine (VYVANSE) 30 MG capsule; Take 1 capsule (30 mg) by mouth every morning  Dispense: 20 capsule; Refill: 0  - Urine Drugs of Abuse Screen Panel 1 - Drug Screen (Full); Future    3. Annual physical exam  - CBC with platelets; Future  - Comprehensive metabolic panel (BMP + Alb, Alk Phos, ALT, AST, Total. Bili, TP); Future  - Lipid panel reflex to direct LDL Fasting; Future    Follow up: 1 month virtual ADHD    Options for treatment and follow-up care were reviewed with the patient and/or guardian. William Bansal and/or guardian engaged in the decision making process and verbalized understanding of the options discussed and agreed with the final plan.    Dr. Daquan Boggs         HPI:   William Bansal is a 27 year old  male who presents for:    Chief Complaint   Patient presents with    Recheck Medication    Asthma     Uses albuterol as needed. Asthma has been worse since he ran a race about 2 months ago. Not  "using controller inhaler he was previously prescribed        6/30/2022     3:06 PM 10/24/2022     8:22 AM 8/25/2023     9:52 AM   ACT Total Scores   ACT TOTAL SCORE (Goal Greater than or Equal to 20) 13 14 13   In the past 12 months, how many times did you visit the emergency room for your asthma without being admitted to the hospital? 0 0 0   In the past 12 months, how many times were you hospitalized overnight because of your asthma? 0 0 0     ADHD  Diagnosed as a kid. On concerta. 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 Clear Hx in chart.           PMHX:     Patient Active Problem List   Diagnosis    Migraine Headache    Short Stature    ADHD (attention deficit hyperactivity disorder)    Superior Glenoid Labrum Lesion    Exercise-induced asthma    Allergic Rhinitis    Eczema    Hypovitaminosis D - Vitamin [place] almost certainly [place]    Mild persistent asthma without complication       Social History     Tobacco Use    Smoking status: Never    Smokeless tobacco: Never   Substance Use Topics    Alcohol use: Yes     Alcohol/week: 2.0 standard drinks of alcohol    Drug use: Yes     Types: Marijuana     Comment: Drug use: Used legally in [place] but not in [place]       Social History     Social History Narrative    Not on file       Allergies   Allergen Reactions    Latex Hives       No results found for this or any previous visit (from the past 24 hour(s)).         Review of Systems:    ROS: 10 point ROS neg other than the symptoms noted above in the HPI.         Physical Exam:     Vitals:    08/25/23 0958 08/25/23 1002   BP: (!) 86/54 (!) 88/54   BP Location: Right arm Right arm   Patient Position: Sitting Sitting   Cuff Size: Adult Regular Adult Regular   Pulse: 59    Resp: 16    Temp: 97.7  F (36.5  C)    TempSrc: Temporal    SpO2: 95%    Weight: 64.7 kg (142 lb 10.2 oz)    Height: 1.59 m (5' 2.6\")      Body mass index is 25.59 kg/m .    General appearance: Alert, " cooperative, no distress, appears stated age  Head: Normocephalic, atraumatic, without obvious abnormality  Eyes: Pupils equal round, reactive.  Conjunctiva clear.  Nose: Nares normal, no drainage.  Throat: Lips, mucosa, tongue normal mucosa pink and moist  Neck: Supple, symmetric, trachea midline,  Lungs: Clear to auscultation bilaterally, no wheezing or crackles present.  Respirations unlabored  Heart: Regular rate and rhythm, normal S1 and S2, no murmur, rub or gallop.  Abdomen: Soft, nontender, nondistended.  No masses or organomegaly.  Extremities: Extremities normal, atraumatic.  No cyanosis or edema.  Skin: Skin color, texture, turgor normal no rashes or lesions on limited skin exam  Neurologic: CN II through XII intact, normal strength.

## 2023-08-28 NOTE — RESULT ENCOUNTER NOTE
Isac,  Your results from your recent clinic visit show:  Your Urine drug screen was as expected  Your CMP was normal with normal electrolytes, and liver function. Your Kidneys were a little abnormal (creatinine). This could be from dehydration or other temporary reasons. I would recommend we recheck this in a year  Your lipids look ok and I used these as well as other factors from your history to calculate your 10 year risk of having something like a heart attack (ASCVD risk) and it was low risk. Just continue to work on exercise and diet to maintain this low risk.  Your CBC is normal with no anemia or signs of infection seen    If you have more questions please call the clinic at [phone] or send me a [employer] message    Dr. Daquan Boggs

## 2023-09-25 ASSESSMENT — ASTHMA QUESTIONNAIRES: ACT_TOTALSCORE: 22

## 2023-09-25 NOTE — PROGRESS NOTES
Isac is a 27 year old who is being evaluated via a billable video visit.      How would you like to obtain your AVS? MyChart  If the video visit is dropped, the invitation should be resent by: Text to cell phone: [phone]  Will anyone else be joining your video visit? No          William was seen today for recheck medication and a.d.h.d.    Diagnoses and all orders for this visit:    Attention deficit hyperactivity disorder (ADHD), combined type  -     amphetamine-dextroamphetamine (ADDERALL XR) 20 MG 24 hr capsule; Take 1 capsule (20 mg) by mouth daily           Subjective   Isac is a 27 year old, presenting for the following health issues:  No chief complaint on file.      HPI       He has had previous established diagnosis of attention deficit disorder. At a recent visit on August 25 he was prescribed Vyvanse, the medication unfortunately was not covered by his insurance. Patient states he was previously on Concerta and Adderall. He has been a few years since he has been on any medication treatment for this. He does feel that he has more hyperactivity, distractibility and difficulty with focus to be able to accomplish daily tasks. He would like to return to stimulant therapy. Today we discussed options and felt that based on information available the most reasonable option would be to try generic Adderall 20 mg extended release. We reiterated potential for side effects of the medication and the importance of proper usage. I suggest a follow-up in approximately one month to reassess the situation and consider dose and/or medication adjustment.        Review of Systems   Complete review of systems is obtained.  Other than the specific considerations noted above complete review of systems is negative.        Objective           Vitals:  No vitals were obtained today due to virtual visit.    Physical Exam   GENERAL: Healthy, alert and no distress    PSYCH: Mentation appears normal, affect normal/bright, judgement  and insight intact, normal speech and appearance well-groomed.                Video-Visit Details    Due to inability to connect definitively via the video link we changed it to a phone visit, the duration of the phone visit was eight minutes    Type of service:  Video Visit     Originating Location (pt. Location): Home    Distant Location (provider location):  On-site  Platform used for Video Visit: Plex

## 2023-09-26 ENCOUNTER — VIRTUAL VISIT (OUTPATIENT)
Dept: FAMILY MEDICINE | Facility: CLINIC | Age: 28
End: 2023-09-26
Payer: COMMERCIAL

## 2023-09-26 DIAGNOSIS — F90.2 ATTENTION DEFICIT HYPERACTIVITY DISORDER (ADHD), COMBINED TYPE: Primary | ICD-10-CM

## 2023-09-26 PROCEDURE — 99214 OFFICE O/P EST MOD 30 MIN: CPT | Mod: 93 | Performed by: FAMILY MEDICINE

## 2023-09-26 RX ORDER — DEXTROAMPHETAMINE SACCHARATE, AMPHETAMINE ASPARTATE MONOHYDRATE, DEXTROAMPHETAMINE SULFATE AND AMPHETAMINE SULFATE 5; 5; 5; 5 MG/1; MG/1; MG/1; MG/1
20 CAPSULE, EXTENDED RELEASE ORAL DAILY
Qty: 30 CAPSULE | Refills: 0 | Status: SHIPPED | OUTPATIENT
Start: 2023-09-26

## 2023-10-14 ENCOUNTER — HEALTH MAINTENANCE LETTER (OUTPATIENT)
Age: 28
End: 2023-10-14

## 2024-02-15 ENCOUNTER — MYC MEDICAL ADVICE (OUTPATIENT)
Dept: FAMILY MEDICINE | Facility: CLINIC | Age: 29
End: 2024-02-15
Payer: COMMERCIAL

## 2024-02-15 DIAGNOSIS — J45.30 MILD PERSISTENT ASTHMA WITHOUT COMPLICATION: ICD-10-CM

## 2024-02-15 RX ORDER — FLUTICASONE PROPIONATE AND SALMETEROL XINAFOATE 230; 21 UG/1; UG/1
2 AEROSOL, METERED RESPIRATORY (INHALATION) 2 TIMES DAILY
Qty: 12 G | Refills: 1 | Status: SHIPPED | OUTPATIENT
Start: 2024-02-15 | End: 2024-02-22

## 2024-02-22 RX ORDER — FLUTICASONE PROPIONATE AND SALMETEROL 250; 50 UG/1; UG/1
1 POWDER RESPIRATORY (INHALATION) EVERY 12 HOURS
Qty: 60 EACH | Refills: 0 | Status: SHIPPED | OUTPATIENT
Start: 2024-02-22 | End: 2024-03-21

## 2024-03-21 DIAGNOSIS — J45.30 MILD PERSISTENT ASTHMA WITHOUT COMPLICATION: ICD-10-CM

## 2024-03-21 RX ORDER — FLUTICASONE PROPIONATE AND SALMETEROL 250; 50 UG/1; UG/1
1 POWDER RESPIRATORY (INHALATION) EVERY 12 HOURS
Qty: 60 EACH | Refills: 0 | Status: SHIPPED | OUTPATIENT
Start: 2024-03-21 | End: 2024-04-18

## 2024-04-18 ENCOUNTER — MYC REFILL (OUTPATIENT)
Dept: FAMILY MEDICINE | Facility: CLINIC | Age: 29
End: 2024-04-18
Payer: COMMERCIAL

## 2024-04-18 DIAGNOSIS — J45.30 MILD PERSISTENT ASTHMA WITHOUT COMPLICATION: ICD-10-CM

## 2024-04-18 RX ORDER — FLUTICASONE PROPIONATE AND SALMETEROL 250; 50 UG/1; UG/1
1 POWDER RESPIRATORY (INHALATION) EVERY 12 HOURS
Qty: 60 EACH | Refills: 11 | Status: SHIPPED | OUTPATIENT
Start: 2024-04-18

## 2024-07-26 ENCOUNTER — PATIENT OUTREACH (OUTPATIENT)
Dept: CARE COORDINATION | Facility: CLINIC | Age: 29
End: 2024-07-26
Payer: COMMERCIAL

## 2024-08-19 NOTE — TELEPHONE ENCOUNTER
Provider E-Visit time total (minutes): 2    Covering for PCP     Please set up a VV to discuss sxs.   
Yes

## 2024-09-17 DIAGNOSIS — J45.21 MILD INTERMITTENT ASTHMA WITH ACUTE EXACERBATION: ICD-10-CM

## 2024-09-18 RX ORDER — ALBUTEROL SULFATE 90 UG/1
AEROSOL, METERED RESPIRATORY (INHALATION)
Qty: 18 G | Refills: 11 | Status: SHIPPED | OUTPATIENT
Start: 2024-09-18

## 2024-09-28 ENCOUNTER — HEALTH MAINTENANCE LETTER (OUTPATIENT)
Age: 29
End: 2024-09-28

## 2024-10-23 NOTE — PROGRESS NOTES
"  Assessment & Plan     Muscle cramps  Patient has had intermittent muscle cramps over the last several months, or if this is improving since he has been drinking electrolyte drinks and staying hydrated.  We will check labs including blood count, electrolytes including magnesium and thyroid today.  I will follow-up with results when available.  I encouraged adequate hydration, rest etc.  He will follow-up with any persisting or worsening symptoms.  - Comprehensive metabolic panel (BMP + Alb, Alk Phos, ALT, AST, Total. Bili, TP)  - CBC with platelets  - Magnesium  - TSH with free T4 reflex  - Comprehensive metabolic panel (BMP + Alb, Alk Phos, ALT, AST, Total. Bili, TP)  - CBC with platelets  - Magnesium  - TSH with free T4 reflex    Attention deficit hyperactivity disorder (ADHD), combined type  Reviewed diagnosis of ADHD.  He is currently using Adderall only as needed.  Refill provided today.  Updated controlled substance agreement completed today.  PDMP reviewed.  - amphetamine-dextroamphetamine (ADDERALL XR) 20 MG 24 hr capsule  Dispense: 30 capsule; Refill: 0    Mild persistent asthma without complication  No recent exacerbations.  Refill of Advair to pharmacy.  - fluticasone-salmeterol (ADVAIR) 250-50 MCG/ACT inhaler  Dispense: 60 each; Refill: 11            BMI  Estimated body mass index is 27.58 kg/m  as calculated from the following:    Height as of this encounter: 1.6 m (5' 3\").    Weight as of this encounter: 70.6 kg (155 lb 11.2 oz).       Shamar Chau is a 28 year old, presenting for the following health issues:  No chief complaint on file.        10/24/2024    10:47 AM   Additional Questions   Roomed by kannan     History of Present Illness       Reason for visit:  Normal check up.   He is taking medications regularly.     Patient presents today with concerns of muscle cramping.  He states that this has been going on for the last several months.  He started drinking more electrolyte drinks over the " last several weeks and seems to have improved cramps lately.  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  He wonders if dehydration may have been contributing as well as this seems to be improving now.  He still gets muscle cramps when he yawns.      Patient has history of ADHD and is on Adderall 20 mg XR.  He is utilizing this as needed.  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  He is in need of a refill today.  Reviewed his history of asthma for which he uses Advair and albuterol as needed.  He is in need of refill of Advair today.  He denies any worsening respiratory symptoms, cough etc.    Review of Systems - pertinent positives noted in HPI, otherwise ROS is negative.        Objective    There were no vitals taken for this visit.  There is no height or weight on file to calculate BMI.  Physical Exam   GENERAL: alert and no distress  EYES: Eyes grossly normal to inspection, PERRL and conjunctivae and sclerae normal  NECK: no adenopathy, no asymmetry, masses, or scars  RESP: lungs clear to auscultation - no rales, rhonchi or wheezes  CV: regular rate and rhythm, normal S1 S2, no S3 or S4, no murmur, click or rub, no peripheral edema  ABDOMEN: soft, nontender, no hepatosplenomegaly, no masses and bowel sounds normal  MS: no gross musculoskeletal defects noted, no edema  NEURO: Normal strength and tone, mentation intact and speech normal  PSYCH: mentation appears normal, affect normal/bright        Signed Electronically by: RODERICK Spears CNP

## 2024-10-24 ENCOUNTER — OFFICE VISIT (OUTPATIENT)
Dept: FAMILY MEDICINE | Facility: CLINIC | Age: 29
End: 2024-10-24
Payer: COMMERCIAL

## 2024-10-24 VITALS
RESPIRATION RATE: 10 BRPM | SYSTOLIC BLOOD PRESSURE: 122 MMHG | OXYGEN SATURATION: 98 % | TEMPERATURE: 97.8 F | HEIGHT: 63 IN | WEIGHT: 155.7 LBS | DIASTOLIC BLOOD PRESSURE: 77 MMHG | HEART RATE: 62 BPM | BODY MASS INDEX: 27.59 KG/M2

## 2024-10-24 DIAGNOSIS — F90.2 ATTENTION DEFICIT HYPERACTIVITY DISORDER (ADHD), COMBINED TYPE: ICD-10-CM

## 2024-10-24 DIAGNOSIS — R25.2 MUSCLE CRAMPS: Primary | ICD-10-CM

## 2024-10-24 DIAGNOSIS — J45.30 MILD PERSISTENT ASTHMA WITHOUT COMPLICATION: ICD-10-CM

## 2024-10-24 LAB
ALBUMIN SERPL BCG-MCNC: 4.7 G/DL (ref 3.5–5.2)
ALP SERPL-CCNC: 79 U/L (ref 40–150)
ALT SERPL W P-5'-P-CCNC: 23 U/L (ref 0–70)
ANION GAP SERPL CALCULATED.3IONS-SCNC: 10 MMOL/L (ref 7–15)
AST SERPL W P-5'-P-CCNC: 26 U/L (ref 0–45)
BILIRUB SERPL-MCNC: 0.6 MG/DL
BUN SERPL-MCNC: 19 MG/DL (ref 6–20)
CALCIUM SERPL-MCNC: 9.8 MG/DL (ref 8.8–10.4)
CHLORIDE SERPL-SCNC: 100 MMOL/L (ref 98–107)
CREAT SERPL-MCNC: 1.54 MG/DL (ref 0.67–1.17)
EGFRCR SERPLBLD CKD-EPI 2021: 62 ML/MIN/1.73M2
ERYTHROCYTE [DISTWIDTH] IN BLOOD BY AUTOMATED COUNT: 11.5 % (ref 10–15)
GLUCOSE SERPL-MCNC: 84 MG/DL (ref 70–99)
HCO3 SERPL-SCNC: 27 MMOL/L (ref 22–29)
HCT VFR BLD AUTO: 46.3 % (ref 40–53)
HGB BLD-MCNC: 16.3 G/DL (ref 13.3–17.7)
MAGNESIUM SERPL-MCNC: 2.3 MG/DL (ref 1.7–2.3)
MCH RBC QN AUTO: 30.8 PG (ref 26.5–33)
MCHC RBC AUTO-ENTMCNC: 35.2 G/DL (ref 31.5–36.5)
MCV RBC AUTO: 88 FL (ref 78–100)
PLATELET # BLD AUTO: 240 10E3/UL (ref 150–450)
POTASSIUM SERPL-SCNC: 5.5 MMOL/L (ref 3.4–5.3)
PROT SERPL-MCNC: 7.9 G/DL (ref 6.4–8.3)
RBC # BLD AUTO: 5.29 10E6/UL (ref 4.4–5.9)
SODIUM SERPL-SCNC: 137 MMOL/L (ref 135–145)
TSH SERPL DL<=0.005 MIU/L-ACNC: 1.58 UIU/ML (ref 0.3–4.2)
WBC # BLD AUTO: 7.4 10E3/UL (ref 4–11)

## 2024-10-24 PROCEDURE — 99214 OFFICE O/P EST MOD 30 MIN: CPT | Mod: 25 | Performed by: NURSE PRACTITIONER

## 2024-10-24 PROCEDURE — 91320 SARSCV2 VAC 30MCG TRS-SUC IM: CPT | Performed by: NURSE PRACTITIONER

## 2024-10-24 PROCEDURE — 85027 COMPLETE CBC AUTOMATED: CPT | Performed by: NURSE PRACTITIONER

## 2024-10-24 PROCEDURE — 90656 IIV3 VACC NO PRSV 0.5 ML IM: CPT | Performed by: NURSE PRACTITIONER

## 2024-10-24 PROCEDURE — 83735 ASSAY OF MAGNESIUM: CPT | Performed by: NURSE PRACTITIONER

## 2024-10-24 PROCEDURE — 90471 IMMUNIZATION ADMIN: CPT | Performed by: NURSE PRACTITIONER

## 2024-10-24 PROCEDURE — 84443 ASSAY THYROID STIM HORMONE: CPT | Performed by: NURSE PRACTITIONER

## 2024-10-24 PROCEDURE — 36415 COLL VENOUS BLD VENIPUNCTURE: CPT | Performed by: NURSE PRACTITIONER

## 2024-10-24 PROCEDURE — 90480 ADMN SARSCOV2 VAC 1/ONLY CMP: CPT | Performed by: NURSE PRACTITIONER

## 2024-10-24 PROCEDURE — 80053 COMPREHEN METABOLIC PANEL: CPT | Performed by: NURSE PRACTITIONER

## 2024-10-24 RX ORDER — DEXTROAMPHETAMINE SACCHARATE, AMPHETAMINE ASPARTATE MONOHYDRATE, DEXTROAMPHETAMINE SULFATE AND AMPHETAMINE SULFATE 5; 5; 5; 5 MG/1; MG/1; MG/1; MG/1
20 CAPSULE, EXTENDED RELEASE ORAL DAILY
Qty: 30 CAPSULE | Refills: 0 | Status: SHIPPED | OUTPATIENT
Start: 2024-10-24

## 2024-10-24 RX ORDER — FLUTICASONE PROPIONATE AND SALMETEROL 250; 50 UG/1; UG/1
1 POWDER RESPIRATORY (INHALATION) EVERY 12 HOURS
Qty: 60 EACH | Refills: 11 | Status: SHIPPED | OUTPATIENT
Start: 2024-10-24

## 2024-10-24 ASSESSMENT — ASTHMA QUESTIONNAIRES
QUESTION_3 LAST FOUR WEEKS HOW OFTEN DID YOUR ASTHMA SYMPTOMS (WHEEZING, COUGHING, SHORTNESS OF BREATH, CHEST TIGHTNESS OR PAIN) WAKE YOU UP AT NIGHT OR EARLIER THAN USUAL IN THE MORNING: NOT AT ALL
QUESTION_5 LAST FOUR WEEKS HOW WOULD YOU RATE YOUR ASTHMA CONTROL: SOMEWHAT CONTROLLED
QUESTION_1 LAST FOUR WEEKS HOW MUCH OF THE TIME DID YOUR ASTHMA KEEP YOU FROM GETTING AS MUCH DONE AT WORK, SCHOOL OR AT HOME: SOME OF THE TIME
ACT_TOTALSCORE: 18
QUESTION_2 LAST FOUR WEEKS HOW OFTEN HAVE YOU HAD SHORTNESS OF BREATH: THREE TO SIX TIMES A WEEK
ACT_TOTALSCORE: 18
QUESTION_4 LAST FOUR WEEKS HOW OFTEN HAVE YOU USED YOUR RESCUE INHALER OR NEBULIZER MEDICATION (SUCH AS ALBUTEROL): ONCE A WEEK OR LESS

## 2024-10-24 ASSESSMENT — PAIN SCALES - GENERAL: PAINLEVEL_OUTOF10: NO PAIN (0)
